# Patient Record
Sex: FEMALE | URBAN - METROPOLITAN AREA
[De-identification: names, ages, dates, MRNs, and addresses within clinical notes are randomized per-mention and may not be internally consistent; named-entity substitution may affect disease eponyms.]

---

## 2017-08-14 ENCOUNTER — NURSE ONLY (OUTPATIENT)
Dept: FAMILY MEDICINE CLINIC | Facility: CLINIC | Age: 32
End: 2017-08-14

## 2017-08-14 VITALS — DIASTOLIC BLOOD PRESSURE: 68 MMHG | OXYGEN SATURATION: 98 % | HEART RATE: 74 BPM | SYSTOLIC BLOOD PRESSURE: 104 MMHG

## 2017-08-14 DIAGNOSIS — H11.32 SUBCONJUNCTIVAL HEMORRHAGE, NON-TRAUMATIC, LEFT: Primary | ICD-10-CM

## 2017-08-14 PROCEDURE — 99201 OFFICE/OUTPT VISIT,NEW,LEVL I: CPT | Performed by: NURSE PRACTITIONER

## 2017-08-14 NOTE — PATIENT INSTRUCTIONS
Hemorragia subconjuntival    Tiarra hemorragia subconjuntival se produce angela resultado de un vaso sanguíneo que se ha roto en la parte caro del ingrid. No suele causar dolor y puede haberse roto al toser, estornudar o vomitar.  Leslie Vazquze en el ingrid también p

## 2017-08-17 ENCOUNTER — CHARTING TRANS (OUTPATIENT)
Dept: OTHER | Age: 32
End: 2017-08-17

## 2017-08-17 ENCOUNTER — LAB SERVICES (OUTPATIENT)
Dept: OTHER | Age: 32
End: 2017-08-17

## 2017-08-17 LAB
AMB EXT CHLAMYDIA, NUCLEIC ACID AMP: NEGATIVE
AMB EXT GONOCOCCUS, NUCLEIC ACID AMP: NEGATIVE
ANTIBODY SCREEN OB: NEGATIVE
HEPATITIS B SURFACE ANTIGEN OB: NEGATIVE
HIV RESULT OB: NEGATIVE
RH FACTOR OB: POSITIVE

## 2017-08-31 ENCOUNTER — CHARTING TRANS (OUTPATIENT)
Dept: OTHER | Age: 32
End: 2017-08-31

## 2017-08-31 LAB
ABO + RH BLD: NORMAL
BACTERIA UR CULT: NORMAL
BASOPHILS # BLD: 0 K/MCL (ref 0–0.3)
BASOPHILS NFR BLD: 0 %
BLD GP AB SCN SERPL QL: NEGATIVE
C TRACH RRNA SPEC QL NAA+PROBE: NEGATIVE
DIFFERENTIAL METHOD BLD: NORMAL
EOSINOPHIL # BLD: 0.4 K/MCL (ref 0.1–0.5)
EOSINOPHIL NFR BLD: 4 %
ERYTHROCYTE [DISTWIDTH] IN BLOOD: 13.6 % (ref 11–15)
HBV SURFACE AG SER QL: NEGATIVE
HEMATOCRIT: 36.9 % (ref 36–46.5)
HEMOGLOBIN: 12.3 G/DL (ref 12–15.5)
HIV 1+2 AB+HIV1 P24 AG SERPL QL IA: NONREACTIVE
LYMPHOCYTES # BLD: 2.3 K/MCL (ref 1–4.8)
LYMPHOCYTES NFR BLD: 22 %
MEAN CORPUSCULAR HEMOGLOBIN: 27.9 PG (ref 26–34)
MEAN CORPUSCULAR HGB CONC: 33.3 G/DL (ref 32–36.5)
MEAN CORPUSCULAR VOLUME: 83.7 FL (ref 78–100)
MONOCYTES # BLD: 0.7 K/MCL (ref 0.3–0.9)
MONOCYTES NFR BLD: 6 %
N GONORRHOEA RRNA SPEC QL NAA+PROBE: NEGATIVE
NEUTROPHILS # BLD: 7 K/MCL (ref 1.8–7.7)
NEUTROPHILS NFR BLD: 68 %
PLATELET COUNT: 312 K/MCL (ref 140–450)
RED CELL COUNT: 4.41 MIL/MCL (ref 4–5.2)
RUBV IGG SERPL IA-ACNC: >500 UNITS/ML
SPECIMEN SOURCE: NORMAL
T PALLIDUM IGG SER QL IA: NONREACTIVE
VZV IGG SER IA-ACNC: 2.32 OD RATIO
WHITE BLOOD COUNT: 10.4 K/MCL (ref 4.2–11)

## 2017-09-01 ENCOUNTER — LAB SERVICES (OUTPATIENT)
Dept: OTHER | Age: 32
End: 2017-09-01

## 2017-09-01 ENCOUNTER — CHARTING TRANS (OUTPATIENT)
Dept: OTHER | Age: 32
End: 2017-09-01

## 2017-09-01 LAB
GLUCOSE U: NORMAL
KETONES: NORMAL
LEUKOCYTES: NORMAL
NITRITE: NORMAL
OCCULT BLOOD: NORMAL
PH: 6
PROTEIN: NORMAL

## 2017-09-12 ENCOUNTER — CHARTING TRANS (OUTPATIENT)
Dept: OTHER | Age: 32
End: 2017-09-12

## 2017-09-13 LAB — HPV I/H RISK 4 DNA CVX QL NAA+PROBE: NORMAL

## 2017-10-04 ENCOUNTER — CHARTING TRANS (OUTPATIENT)
Dept: OTHER | Age: 32
End: 2017-10-04

## 2017-10-04 ENCOUNTER — LAB SERVICES (OUTPATIENT)
Dept: OTHER | Age: 32
End: 2017-10-04

## 2017-10-04 LAB
GLUCOSE U: NORMAL
KETONES: NEGATIVE
LEUKOCYTES: NEGATIVE
NITRITE: NEGATIVE
OCCULT BLOOD: NEGATIVE
PROTEIN: NEGATIVE

## 2017-10-27 ENCOUNTER — CHARTING TRANS (OUTPATIENT)
Dept: OTHER | Age: 32
End: 2017-10-27

## 2017-10-27 LAB
GLUCOSE U: NORMAL
PROTEIN: NORMAL

## 2017-11-18 ENCOUNTER — LAB SERVICES (OUTPATIENT)
Dept: OTHER | Age: 32
End: 2017-11-18

## 2017-11-20 LAB
BASOPHILS # BLD: 0 K/MCL (ref 0–0.3)
BASOPHILS NFR BLD: 0 %
DIFFERENTIAL METHOD BLD: ABNORMAL
EOSINOPHIL # BLD: 0.3 K/MCL (ref 0.1–0.5)
EOSINOPHIL NFR BLD: 3 %
ERYTHROCYTE [DISTWIDTH] IN BLOOD: 14.5 % (ref 11–15)
HEMATOCRIT: 34.1 % (ref 36–46.5)
HEMOGLOBIN: 11 G/DL (ref 12–15.5)
LYMPHOCYTES # BLD: 1.8 K/MCL (ref 1–4.8)
LYMPHOCYTES NFR BLD: 17 %
MEAN CORPUSCULAR HEMOGLOBIN: 28.9 PG (ref 26–34)
MEAN CORPUSCULAR HGB CONC: 32.3 G/DL (ref 32–36.5)
MEAN CORPUSCULAR VOLUME: 89.7 FL (ref 78–100)
MONOCYTES # BLD: 0.7 K/MCL (ref 0.3–0.9)
MONOCYTES NFR BLD: 7 %
NEUTROPHILS # BLD: 8 K/MCL (ref 1.8–7.7)
NEUTROPHILS NFR BLD: 73 %
PLAT MORPH BLD: NORMAL
PLATELET COUNT: 311 K/MCL (ref 140–450)
RBC MORPH BLD: NORMAL
RED CELL COUNT: 3.8 MIL/MCL (ref 4–5.2)
WHITE BLOOD COUNT: 10.9 K/MCL (ref 4.2–11)

## 2017-11-28 ENCOUNTER — CHARTING TRANS (OUTPATIENT)
Dept: OTHER | Age: 32
End: 2017-11-28

## 2017-11-28 LAB — GLUCOSE 1H P 50 G GLC PO SERPL-MCNC: 112 MG/DL (ref 65–139)

## 2017-12-15 ENCOUNTER — LAB SERVICES (OUTPATIENT)
Dept: OTHER | Age: 32
End: 2017-12-15

## 2017-12-29 ENCOUNTER — IMAGING SERVICES (OUTPATIENT)
Dept: OTHER | Age: 32
End: 2017-12-29

## 2017-12-29 ENCOUNTER — HOSPITAL (OUTPATIENT)
Dept: OTHER | Age: 32
End: 2017-12-29
Attending: OBSTETRICS & GYNECOLOGY

## 2017-12-31 ENCOUNTER — CHARTING TRANS (OUTPATIENT)
Dept: OTHER | Age: 32
End: 2017-12-31

## 2018-01-02 LAB
HIV 1+2 AB+HIV1 P24 AG SERPL QL IA: NONREACTIVE
RPR SER QL: NONREACTIVE

## 2018-01-08 ENCOUNTER — CHARTING TRANS (OUTPATIENT)
Dept: OTHER | Age: 33
End: 2018-01-08

## 2018-01-16 ENCOUNTER — HOSPITAL ENCOUNTER (OUTPATIENT)
Facility: HOSPITAL | Age: 33
Setting detail: OBSERVATION
Discharge: HOME | End: 2018-01-16
Attending: OBSTETRICS & GYNECOLOGY | Admitting: OBSTETRICS & GYNECOLOGY

## 2018-01-16 VITALS
SYSTOLIC BLOOD PRESSURE: 107 MMHG | TEMPERATURE: 98 F | RESPIRATION RATE: 18 BRPM | DIASTOLIC BLOOD PRESSURE: 61 MMHG | HEART RATE: 79 BPM

## 2018-01-16 PROBLEM — O26.899 ABDOMINAL PAIN DURING PREGNANCY: Status: ACTIVE | Noted: 2018-01-16

## 2018-01-16 PROBLEM — R10.9 ABDOMINAL PAIN DURING PREGNANCY (HCC): Status: ACTIVE | Noted: 2018-01-16

## 2018-01-16 PROBLEM — O26.899 ABDOMINAL PAIN DURING PREGNANCY (HCC): Status: ACTIVE | Noted: 2018-01-16

## 2018-01-16 PROBLEM — R10.9 ABDOMINAL PAIN DURING PREGNANCY: Status: ACTIVE | Noted: 2018-01-16

## 2018-01-16 PROCEDURE — 76815 OB US LIMITED FETUS(S): CPT

## 2018-01-16 PROCEDURE — 99202 OFFICE O/P NEW SF 15 MIN: CPT

## 2018-01-16 PROCEDURE — 59025 FETAL NON-STRESS TEST: CPT

## 2018-01-16 RX ORDER — PRENATAL VIT/IRON FUM/FOLIC AC 27MG-0.8MG
1 TABLET ORAL DAILY
COMMUNITY

## 2018-01-17 NOTE — PROGRESS NOTES
Pt receives [facility] with Dr. James Vang at [facility]. Reports upper right quadrant pain. Constant burning pain 9/10. Near place where infant head is located in abdomen as  reports infant is breech.  Pt reports baby is moving, but only a small amount an

## 2018-01-17 NOTE — TRIAGE
[facility]      Triage Note    Yana Medina Patient Status:  Observation    9/15/1985 MRN [id number]   Location [address]  Attending Tomas Dugan MD   Hosp Day # 0 PCP No primary care provider on file. abdomen as  reports infant is breech. Pt reports baby is moving, but only a small amount and it feels \"different like baby \"is balled up (points to upper right abdomen). \" Pt denies urinary symptoms. Pt denies leaking of fluid.  Pt is previous cesa

## 2018-01-17 NOTE — DISCHARGE PLANNING
Discharge planning reviewed with patient in [facility] and [address] [facility] handouts given on pre-eclampsia, pre-term labor, and kick counts.  Pt will follow- up with her doctor on Thursday as scheduled

## 2018-01-17 NOTE — PROGRESS NOTES
Consent for prenatal records signed by the patient and faxed to [facility]. L/D called and records requested.

## 2018-01-18 ENCOUNTER — CHARTING TRANS (OUTPATIENT)
Dept: OTHER | Age: 33
End: 2018-01-18

## 2018-01-25 ENCOUNTER — CHARTING TRANS (OUTPATIENT)
Dept: OTHER | Age: 33
End: 2018-01-25

## 2018-01-25 ENCOUNTER — LAB SERVICES (OUTPATIENT)
Dept: OTHER | Age: 33
End: 2018-01-25

## 2018-01-25 LAB
GLUCOSE U: NORMAL
PROTEIN: NORMAL

## 2018-01-29 LAB — GP B STREP CULTURE (STGEN) HL: NORMAL

## 2018-01-31 ENCOUNTER — CHARTING TRANS (OUTPATIENT)
Dept: OTHER | Age: 33
End: 2018-01-31

## 2018-02-02 ENCOUNTER — HOSPITAL (OUTPATIENT)
Dept: OTHER | Age: 33
End: 2018-02-02
Attending: OBSTETRICS & GYNECOLOGY

## 2018-02-02 LAB
ANALYZER ANC (IANC): ABNORMAL
BASOPHILS # BLD: 0 THOUSAND/MCL (ref 0–0.3)
BASOPHILS NFR BLD: 0 %
DIFFERENTIAL METHOD BLD: ABNORMAL
EOSINOPHIL # BLD: 0.1 THOUSAND/MCL (ref 0.1–0.5)
EOSINOPHIL NFR BLD: 1 %
ERYTHROCYTE [DISTWIDTH] IN BLOOD: 14.8 % (ref 11–15)
HEMATOCRIT: 34.1 % (ref 36–46.5)
HGB BLD-MCNC: 11.6 GM/DL (ref 12–15.5)
LYMPHOCYTES # BLD: 2.6 THOUSAND/MCL (ref 1–4.8)
LYMPHOCYTES NFR BLD: 24 %
MCH RBC QN AUTO: 28.1 PG (ref 26–34)
MCHC RBC AUTO-ENTMCNC: 34 GM/DL (ref 32–36.5)
MCV RBC AUTO: 82.6 FL (ref 78–100)
MONOCYTES # BLD: 0.8 THOUSAND/MCL (ref 0.3–0.9)
MONOCYTES NFR BLD: 7 %
NEUTROPHILS # BLD: 7.2 THOUSAND/MCL (ref 1.8–7.7)
NEUTROPHILS NFR BLD: 68 %
NEUTS SEG NFR BLD: ABNORMAL %
PERCENT NRBC: ABNORMAL
PLATELET # BLD: 284 THOUSAND/MCL (ref 140–450)
RBC # BLD: 4.13 MILLION/MCL (ref 4–5.2)
WBC # BLD: 10.8 THOUSAND/MCL (ref 4.2–11)

## 2018-02-03 LAB
HEMATOCRIT: 28.8 % (ref 36–46.5)
HGB BLD-MCNC: 9.6 GM/DL (ref 12–15.5)

## 2018-03-21 ENCOUNTER — CHARTING TRANS (OUTPATIENT)
Dept: OTHER | Age: 33
End: 2018-03-21

## 2018-04-18 ENCOUNTER — CHARTING TRANS (OUTPATIENT)
Dept: OTHER | Age: 33
End: 2018-04-18

## 2018-04-18 ENCOUNTER — LAB SERVICES (OUTPATIENT)
Dept: OTHER | Age: 33
End: 2018-04-18

## 2018-04-18 LAB — MONOCLONAL PREGNANCY: NORMAL

## 2018-04-20 ENCOUNTER — CHARTING TRANS (OUTPATIENT)
Dept: OTHER | Age: 33
End: 2018-04-20

## 2018-04-24 LAB — SURGICAL PATHOLOGY: NORMAL

## 2018-11-01 VITALS
SYSTOLIC BLOOD PRESSURE: 110 MMHG | DIASTOLIC BLOOD PRESSURE: 60 MMHG | HEIGHT: 63 IN | WEIGHT: 181 LBS | BODY MASS INDEX: 32.07 KG/M2

## 2018-11-01 VITALS
HEIGHT: 63 IN | WEIGHT: 203 LBS | DIASTOLIC BLOOD PRESSURE: 64 MMHG | BODY MASS INDEX: 35.97 KG/M2 | SYSTOLIC BLOOD PRESSURE: 120 MMHG

## 2018-11-02 VITALS — DIASTOLIC BLOOD PRESSURE: 68 MMHG | SYSTOLIC BLOOD PRESSURE: 108 MMHG | WEIGHT: 186 LBS

## 2018-11-02 VITALS
HEIGHT: 63 IN | WEIGHT: 198 LBS | SYSTOLIC BLOOD PRESSURE: 108 MMHG | BODY MASS INDEX: 35.08 KG/M2 | DIASTOLIC BLOOD PRESSURE: 62 MMHG

## 2018-11-02 VITALS — DIASTOLIC BLOOD PRESSURE: 62 MMHG | WEIGHT: 200 LBS | SYSTOLIC BLOOD PRESSURE: 98 MMHG

## 2018-11-02 VITALS — WEIGHT: 199 LBS | SYSTOLIC BLOOD PRESSURE: 118 MMHG | DIASTOLIC BLOOD PRESSURE: 64 MMHG

## 2018-11-02 VITALS — SYSTOLIC BLOOD PRESSURE: 102 MMHG | DIASTOLIC BLOOD PRESSURE: 62 MMHG | WEIGHT: 176 LBS

## 2018-11-02 VITALS
HEIGHT: 63 IN | SYSTOLIC BLOOD PRESSURE: 134 MMHG | DIASTOLIC BLOOD PRESSURE: 76 MMHG | BODY MASS INDEX: 34.2 KG/M2 | WEIGHT: 193 LBS

## 2018-11-02 VITALS
WEIGHT: 172 LBS | HEIGHT: 63 IN | BODY MASS INDEX: 30.48 KG/M2 | DIASTOLIC BLOOD PRESSURE: 66 MMHG | SYSTOLIC BLOOD PRESSURE: 110 MMHG

## 2018-11-03 VITALS — WEIGHT: 169 LBS | BODY MASS INDEX: 29.95 KG/M2 | HEIGHT: 63 IN

## 2018-11-03 VITALS
HEIGHT: 63 IN | WEIGHT: 171 LBS | DIASTOLIC BLOOD PRESSURE: 58 MMHG | SYSTOLIC BLOOD PRESSURE: 110 MMHG | BODY MASS INDEX: 30.3 KG/M2

## 2023-02-06 ENCOUNTER — OFFICE VISIT (OUTPATIENT)
Dept: OBGYN CLINIC | Facility: CLINIC | Age: 38
End: 2023-02-06

## 2023-02-06 VITALS — WEIGHT: 182.19 LBS | DIASTOLIC BLOOD PRESSURE: 67 MMHG | SYSTOLIC BLOOD PRESSURE: 118 MMHG

## 2023-02-06 DIAGNOSIS — N92.6 MISSED MENSES: Primary | ICD-10-CM

## 2023-02-06 LAB
CONTROL LINE PRESENT WITH A CLEAR BACKGROUND (YES/NO): YES YES/NO
KIT LOT #: 2034 NUMERIC
PREGNANCY TEST, URINE: POSITIVE

## 2023-02-06 PROCEDURE — 99202 OFFICE O/P NEW SF 15 MIN: CPT | Performed by: OBSTETRICS & GYNECOLOGY

## 2023-02-06 PROCEDURE — 3078F DIAST BP <80 MM HG: CPT | Performed by: OBSTETRICS & GYNECOLOGY

## 2023-02-06 PROCEDURE — 81025 URINE PREGNANCY TEST: CPT | Performed by: OBSTETRICS & GYNECOLOGY

## 2023-02-06 PROCEDURE — 3074F SYST BP LT 130 MM HG: CPT | Performed by: OBSTETRICS & GYNECOLOGY

## 2023-02-13 ENCOUNTER — OFFICE VISIT (OUTPATIENT)
Dept: OBGYN CLINIC | Facility: CLINIC | Age: 38
End: 2023-02-13

## 2023-02-13 VITALS — SYSTOLIC BLOOD PRESSURE: 141 MMHG | DIASTOLIC BLOOD PRESSURE: 87 MMHG

## 2023-02-13 DIAGNOSIS — Z36.87 UNSURE OF LMP (LAST MENSTRUAL PERIOD) AS REASON FOR ULTRASOUND SCAN: Primary | ICD-10-CM

## 2023-02-13 DIAGNOSIS — Z32.01 PREGNANCY EXAMINATION OR TEST, POSITIVE RESULT: ICD-10-CM

## 2023-02-21 ENCOUNTER — TELEPHONE (OUTPATIENT)
Dept: OBGYN CLINIC | Facility: CLINIC | Age: 38
End: 2023-02-21

## 2023-02-21 ENCOUNTER — NURSE ONLY (OUTPATIENT)
Dept: OBGYN CLINIC | Facility: CLINIC | Age: 38
End: 2023-02-21

## 2023-02-21 VITALS — BODY MASS INDEX: 33 KG/M2 | HEIGHT: 62 IN

## 2023-02-21 DIAGNOSIS — Z34.82 ENCOUNTER FOR SUPERVISION OF OTHER NORMAL PREGNANCY IN SECOND TRIMESTER: Primary | ICD-10-CM

## 2023-02-21 NOTE — PROGRESS NOTES
Pt Name and  verified. OB History     T2    L2    SAB0  IAB0  Ectopic0  Multiple0  Live Births2   Pt called today for RN [facility] Education. Missed menses apt with: CHARLES  LMP: 2022    Pre  BMI: 31.09 ASA therapy initiated. EPDS score: 0/30  +UPT at home: 2022    +UPT in office:  2023    US: 2023  Working BALTA: 2023  Hx of genetic abnormality in family: none  Hx of varicella: had as a child   Flu Vaccine: has received   Covid Vaccine: Has received and is boosted.  Consent (if needed): Repeat      Sterilization/Contraception: interested in Tubal.    OUD Screening: Pt. Has answered NO 5P questions and has NO  risk factors. Pt. Given What pregnant women need to know handout. SDOH was completed. Educational material reviewed with patient: Prenatal care, nutrition, weight gain recommendations, travel, exercise, intercourse, pregnancy changes, safe medications, pregnancy and work, fetal movement, labor and  labor, warning signs, food safety, tdap, cord blood, breastfeeding and bottle feeding, circumcision no, and Group B strep. Blood transfusion if needed: yes if needed in PN care. PN labs: placed and pt informed to complete prior to next apt. Optional genetic screening labs were reviewed: Nena Riddles, FTS with US, Quad screen MSAFP and CF screening. Declines genetic testing at this time. [facility] Media Policy: reviewed and VU. NOB apt: with CHARLES.

## 2023-02-22 ENCOUNTER — LAB ENCOUNTER (OUTPATIENT)
Dept: LAB | Age: 38
End: 2023-02-22
Attending: OBSTETRICS & GYNECOLOGY
Payer: MEDICAID

## 2023-02-22 DIAGNOSIS — Z34.82 ENCOUNTER FOR SUPERVISION OF OTHER NORMAL PREGNANCY IN SECOND TRIMESTER: ICD-10-CM

## 2023-02-22 LAB
ANTIBODY SCREEN: NEGATIVE
BASOPHILS # BLD AUTO: 0.05 X10(3) UL (ref 0–0.2)
BASOPHILS NFR BLD AUTO: 0.4 %
BILIRUB UR QL: NEGATIVE
CLARITY UR: CLEAR
DEPRECATED HBV CORE AB SER IA-ACNC: 37.4 NG/ML
DEPRECATED RDW RBC AUTO: 39.7 FL (ref 35.1–46.3)
EOSINOPHIL # BLD AUTO: 0.18 X10(3) UL (ref 0–0.7)
EOSINOPHIL NFR BLD AUTO: 1.5 %
ERYTHROCYTE [DISTWIDTH] IN BLOOD BY AUTOMATED COUNT: 13.2 % (ref 11–15)
GLUCOSE UR-MCNC: 150 MG/DL
HBV SURFACE AG SER-ACNC: <0.1 [IU]/L
HBV SURFACE AG SERPL QL IA: NONREACTIVE
HCT VFR BLD AUTO: 33.6 %
HCV AB SERPL QL IA: NONREACTIVE
HGB BLD-MCNC: 11.4 G/DL
HGB UR QL STRIP.AUTO: NEGATIVE
IMM GRANULOCYTES # BLD AUTO: 0.04 X10(3) UL (ref 0–1)
IMM GRANULOCYTES NFR BLD: 0.3 %
KETONES UR-MCNC: 40 MG/DL
LEUKOCYTE ESTERASE UR QL STRIP.AUTO: NEGATIVE
LYMPHOCYTES # BLD AUTO: 2.79 X10(3) UL (ref 1–4)
LYMPHOCYTES NFR BLD AUTO: 23.8 %
MCH RBC QN AUTO: 28 PG (ref 26–34)
MCHC RBC AUTO-ENTMCNC: 33.9 G/DL (ref 31–37)
MCV RBC AUTO: 82.6 FL
MONOCYTES # BLD AUTO: 0.5 X10(3) UL (ref 0.1–1)
MONOCYTES NFR BLD AUTO: 4.3 %
NEUTROPHILS # BLD AUTO: 8.18 X10 (3) UL (ref 1.5–7.7)
NEUTROPHILS # BLD AUTO: 8.18 X10(3) UL (ref 1.5–7.7)
NEUTROPHILS NFR BLD AUTO: 69.7 %
NITRITE UR QL STRIP.AUTO: NEGATIVE
PH UR: 6 [PH] (ref 5–8)
PLATELET # BLD AUTO: 323 10(3)UL (ref 150–450)
PROT UR-MCNC: NEGATIVE MG/DL
RBC # BLD AUTO: 4.07 X10(6)UL
RH BLOOD TYPE: POSITIVE
RUBV IGG SER QL: POSITIVE
RUBV IGG SER-ACNC: >500 IU/ML (ref 10–?)
SP GR UR STRIP: 1.02 (ref 1–1.03)
UROBILINOGEN UR STRIP-ACNC: NORMAL
WBC # BLD AUTO: 11.7 X10(3) UL (ref 4–11)

## 2023-02-22 PROCEDURE — 86780 TREPONEMA PALLIDUM: CPT

## 2023-02-22 PROCEDURE — 86901 BLOOD TYPING SEROLOGIC RH(D): CPT

## 2023-02-22 PROCEDURE — 86803 HEPATITIS C AB TEST: CPT

## 2023-02-22 PROCEDURE — 86850 RBC ANTIBODY SCREEN: CPT

## 2023-02-22 PROCEDURE — 85025 COMPLETE CBC W/AUTO DIFF WBC: CPT

## 2023-02-22 PROCEDURE — 87389 HIV-1 AG W/HIV-1&-2 AB AG IA: CPT

## 2023-02-22 PROCEDURE — 87340 HEPATITIS B SURFACE AG IA: CPT

## 2023-02-22 PROCEDURE — 87086 URINE CULTURE/COLONY COUNT: CPT

## 2023-02-22 PROCEDURE — 36415 COLL VENOUS BLD VENIPUNCTURE: CPT

## 2023-02-22 PROCEDURE — 86762 RUBELLA ANTIBODY: CPT

## 2023-02-22 PROCEDURE — 82728 ASSAY OF FERRITIN: CPT

## 2023-02-22 PROCEDURE — 86900 BLOOD TYPING SEROLOGIC ABO: CPT

## 2023-02-24 LAB — T PALLIDUM AB SER QL: NEGATIVE

## 2023-02-27 ENCOUNTER — TELEPHONE (OUTPATIENT)
Dept: OBGYN CLINIC | Facility: CLINIC | Age: 38
End: 2023-02-27

## 2023-02-27 DIAGNOSIS — E66.01 OBESITY, MORBID, BMI 50 OR HIGHER (HCC): Primary | ICD-10-CM

## 2023-02-27 NOTE — TELEPHONE ENCOUNTER
Spoke to patient informed of normal initial prenatal labs, also informed early 1hr gtt needs to be done. Informed patient she can go in 1-2 weeks and lab was entered in epic. Patient verbalized understanding.

## 2023-02-27 NOTE — TELEPHONE ENCOUNTER
----- Message from Divina Rea MD sent at 2/26/2023  4:49 PM CST -----  Please notify patient of normal initial prenatal labs. She will need to have a 50 gram glucola due to maternal BMI with the next 1-2 weeks.   Result noted

## 2023-02-28 ENCOUNTER — INITIAL PRENATAL (OUTPATIENT)
Dept: OBGYN CLINIC | Facility: CLINIC | Age: 38
End: 2023-02-28

## 2023-02-28 VITALS — BODY MASS INDEX: 33 KG/M2 | SYSTOLIC BLOOD PRESSURE: 137 MMHG | DIASTOLIC BLOOD PRESSURE: 83 MMHG | WEIGHT: 182 LBS

## 2023-02-28 DIAGNOSIS — O99.210 OBESITY IN PREGNANCY: ICD-10-CM

## 2023-02-28 DIAGNOSIS — O09.529 ANTEPARTUM MULTIGRAVIDA OF ADVANCED MATERNAL AGE: ICD-10-CM

## 2023-02-28 DIAGNOSIS — O34.219 PREVIOUS CESAREAN DELIVERY, ANTEPARTUM CONDITION OR COMPLICATION: ICD-10-CM

## 2023-02-28 DIAGNOSIS — Z34.82 ENCOUNTER FOR SUPERVISION OF OTHER NORMAL PREGNANCY IN SECOND TRIMESTER: Primary | ICD-10-CM

## 2023-02-28 PROCEDURE — 0500F INITIAL PRENATAL CARE VISIT: CPT | Performed by: OBSTETRICS & GYNECOLOGY

## 2023-02-28 PROCEDURE — 3075F SYST BP GE 130 - 139MM HG: CPT | Performed by: OBSTETRICS & GYNECOLOGY

## 2023-02-28 PROCEDURE — 3079F DIAST BP 80-89 MM HG: CPT | Performed by: OBSTETRICS & GYNECOLOGY

## 2023-03-01 LAB
C TRACH DNA SPEC QL NAA+PROBE: NEGATIVE
HPV I/H RISK 1 DNA SPEC QL NAA+PROBE: NEGATIVE
N GONORRHOEA DNA SPEC QL NAA+PROBE: NEGATIVE

## 2023-03-06 ENCOUNTER — TELEPHONE (OUTPATIENT)
Dept: PERINATAL CARE | Facility: HOSPITAL | Age: 38
End: 2023-03-06

## 2023-03-26 ENCOUNTER — LAB ENCOUNTER (OUTPATIENT)
Dept: LAB | Facility: HOSPITAL | Age: 38
End: 2023-03-26
Attending: OBSTETRICS & GYNECOLOGY
Payer: MEDICAID

## 2023-03-26 DIAGNOSIS — E66.01 OBESITY, MORBID, BMI 50 OR HIGHER (HCC): ICD-10-CM

## 2023-03-26 LAB — GLUCOSE 1H P GLC SERPL-MCNC: 174 MG/DL

## 2023-03-26 PROCEDURE — 36415 COLL VENOUS BLD VENIPUNCTURE: CPT

## 2023-03-26 PROCEDURE — 82950 GLUCOSE TEST: CPT

## 2023-03-27 ENCOUNTER — TELEPHONE (OUTPATIENT)
Dept: OBGYN CLINIC | Facility: CLINIC | Age: 38
End: 2023-03-27

## 2023-03-27 DIAGNOSIS — O99.810 ABNORMAL MATERNAL GLUCOSE TOLERANCE, ANTEPARTUM: Primary | ICD-10-CM

## 2023-03-27 NOTE — TELEPHONE ENCOUNTER
----- Message from Maren Rod MD sent at 3/26/2023  7:15 PM CDT -----  Please notify patient of elevated 50 gram glucola and have her scheduled for a 3 hr GTT.

## 2023-03-27 NOTE — TELEPHONE ENCOUNTER
---Georgian phone line  #[phone] used to translate phone call. Pt called and informed of results and recommendations. Pt voices understanding. Order for 3 hour glucose placed. Pt informed to call centralized scheduling to schedule test. Pt placed in follow up book. ---- Message from Maren Rod MD sent at 3/26/2023  7:15 PM CDT -----  Please notify patient of elevated 50 gram glucola and have her scheduled for a 3 hr GTT.

## 2023-04-03 ENCOUNTER — TELEPHONE (OUTPATIENT)
Dept: PERINATAL CARE | Facility: HOSPITAL | Age: 38
End: 2023-04-03

## 2023-04-07 ENCOUNTER — ROUTINE PRENATAL (OUTPATIENT)
Dept: OBGYN CLINIC | Facility: CLINIC | Age: 38
End: 2023-04-07

## 2023-04-07 VITALS — WEIGHT: 185 LBS | BODY MASS INDEX: 34 KG/M2 | SYSTOLIC BLOOD PRESSURE: 116 MMHG | DIASTOLIC BLOOD PRESSURE: 78 MMHG

## 2023-04-07 DIAGNOSIS — Z34.82 ENCOUNTER FOR SUPERVISION OF OTHER NORMAL PREGNANCY IN SECOND TRIMESTER: Primary | ICD-10-CM

## 2023-04-07 LAB
APPEARANCE: CLEAR
BILIRUBIN: NEGATIVE
GLUCOSE (URINE DIPSTICK): NEGATIVE MG/DL
KETONES (URINE DIPSTICK): NEGATIVE MG/DL
LEUKOCYTES: NEGATIVE
MULTISTIX LOT#: NORMAL NUMERIC
NITRITE, URINE: NEGATIVE
OCCULT BLOOD: NEGATIVE
PH, URINE: 6.5 (ref 4.5–8)
PROTEIN (URINE DIPSTICK): NEGATIVE MG/DL
SPECIFIC GRAVITY: 1.02 (ref 1–1.03)
URINE-COLOR: YELLOW
UROBILINOGEN,SEMI-QN: 0.2 MG/DL (ref 0–1.9)

## 2023-04-07 PROCEDURE — 0502F SUBSEQUENT PRENATAL CARE: CPT | Performed by: OBSTETRICS & GYNECOLOGY

## 2023-04-07 PROCEDURE — 81002 URINALYSIS NONAUTO W/O SCOPE: CPT | Performed by: OBSTETRICS & GYNECOLOGY

## 2023-04-07 PROCEDURE — 3078F DIAST BP <80 MM HG: CPT | Performed by: OBSTETRICS & GYNECOLOGY

## 2023-04-07 PROCEDURE — 3074F SYST BP LT 130 MM HG: CPT | Performed by: OBSTETRICS & GYNECOLOGY

## 2023-04-09 ENCOUNTER — LABORATORY ENCOUNTER (OUTPATIENT)
Dept: LAB | Facility: HOSPITAL | Age: 38
End: 2023-04-09
Attending: OBSTETRICS & GYNECOLOGY
Payer: MEDICAID

## 2023-04-09 DIAGNOSIS — O99.810 ABNORMAL MATERNAL GLUCOSE TOLERANCE, ANTEPARTUM: ICD-10-CM

## 2023-04-09 LAB
GLUCOSE 1H P GLC SERPL-MCNC: 183 MG/DL
GLUCOSE 2H P GLC SERPL-MCNC: 133 MG/DL
GLUCOSE 3H P GLC SERPL-MCNC: 81 MG/DL (ref 70–140)
GLUCOSE P FAST SERPL-MCNC: 95 MG/DL

## 2023-04-09 PROCEDURE — 82952 GTT-ADDED SAMPLES: CPT

## 2023-04-09 PROCEDURE — 36415 COLL VENOUS BLD VENIPUNCTURE: CPT

## 2023-04-09 PROCEDURE — 82951 GLUCOSE TOLERANCE TEST (GTT): CPT

## 2023-04-10 ENCOUNTER — LAB ENCOUNTER (OUTPATIENT)
Dept: LAB | Age: 38
End: 2023-04-10
Attending: OBSTETRICS & GYNECOLOGY
Payer: MEDICAID

## 2023-04-10 ENCOUNTER — TELEPHONE (OUTPATIENT)
Dept: OBGYN CLINIC | Facility: CLINIC | Age: 38
End: 2023-04-10

## 2023-04-10 DIAGNOSIS — O24.419 GESTATIONAL DIABETES MELLITUS (GDM) IN SECOND TRIMESTER, GESTATIONAL DIABETES METHOD OF CONTROL UNSPECIFIED: ICD-10-CM

## 2023-04-10 DIAGNOSIS — O24.419 GESTATIONAL DIABETES MELLITUS (GDM) IN SECOND TRIMESTER, GESTATIONAL DIABETES METHOD OF CONTROL UNSPECIFIED: Primary | ICD-10-CM

## 2023-04-10 LAB
EST. AVERAGE GLUCOSE BLD GHB EST-MCNC: 103 MG/DL (ref 68–126)
HBA1C MFR BLD: 5.2 % (ref ?–5.7)

## 2023-04-10 PROCEDURE — 36415 COLL VENOUS BLD VENIPUNCTURE: CPT

## 2023-04-10 PROCEDURE — 3044F HG A1C LEVEL LT 7.0%: CPT | Performed by: NURSE PRACTITIONER

## 2023-04-10 PROCEDURE — 83036 HEMOGLOBIN GLYCOSYLATED A1C: CPT

## 2023-04-10 PROCEDURE — 87086 URINE CULTURE/COLONY COUNT: CPT

## 2023-04-10 NOTE — TELEPHONE ENCOUNTER
----- Message from Ninoska Oliva MD sent at 4/9/2023  7:45 PM CDT -----  Please notify patient of abnormal 3 hour Glucola testing and the diagnosis of gestational diabetes. Patient will need to go for diabetic education and learn to do 4 times daily Accu-Cheks. She will need to have a dietitian consultation to discuss ADA diet. Patient will need to have a hemoglobin A1c as well as a urine culture and sensitivity ordered.   Patient should follow-up in the office with her 4 times daily Accu-Chek results 1 week after seeing the diabetic educator and learning to do this testing

## 2023-04-10 NOTE — TELEPHONE ENCOUNTER
Patient name and  verified. Patient informed of JJF message and recommendations. Orders placed and advised to complete labs. Verbalized understanding.

## 2023-04-11 ENCOUNTER — HOSPITAL ENCOUNTER (OUTPATIENT)
Dept: PERINATAL CARE | Facility: HOSPITAL | Age: 38
Discharge: HOME | End: 2023-04-11
Attending: OBSTETRICS & GYNECOLOGY
Payer: MEDICAID

## 2023-04-11 ENCOUNTER — HOSPITAL ENCOUNTER (OUTPATIENT)
Dept: ENDOCRINOLOGY | Age: 38
Discharge: HOME | End: 2023-04-11
Attending: OBSTETRICS & GYNECOLOGY
Payer: MEDICAID

## 2023-04-11 VITALS
WEIGHT: 186 LBS | SYSTOLIC BLOOD PRESSURE: 118 MMHG | DIASTOLIC BLOOD PRESSURE: 77 MMHG | BODY MASS INDEX: 34 KG/M2 | HEART RATE: 96 BPM

## 2023-04-11 DIAGNOSIS — O09.522 AMA (ADVANCED MATERNAL AGE) MULTIGRAVIDA 35+, SECOND TRIMESTER: ICD-10-CM

## 2023-04-11 DIAGNOSIS — O09.522 AMA (ADVANCED MATERNAL AGE) MULTIGRAVIDA 35+, SECOND TRIMESTER: Primary | ICD-10-CM

## 2023-04-11 DIAGNOSIS — Z82.79 FAMILY HISTORY OF CONGENITAL ANOMALY: ICD-10-CM

## 2023-04-11 DIAGNOSIS — O99.212 OBESITY AFFECTING PREGNANCY IN SECOND TRIMESTER: ICD-10-CM

## 2023-04-11 DIAGNOSIS — O24.410 DIET CONTROLLED GESTATIONAL DIABETES MELLITUS (GDM) IN SECOND TRIMESTER: ICD-10-CM

## 2023-04-11 DIAGNOSIS — O24.410 DIET CONTROLLED GESTATIONAL DIABETES MELLITUS (GDM) IN SECOND TRIMESTER: Primary | ICD-10-CM

## 2023-04-11 PROCEDURE — 76811 OB US DETAILED SNGL FETUS: CPT | Performed by: OBSTETRICS & GYNECOLOGY

## 2023-04-11 NOTE — PROGRESS NOTES
Felicitas Alta  : 9/15/1985 was seen for Gestational Diabetes Counseling: Individual  1:1 Persian speaking    Date: 2023   Start time: 2:45 pm End time: 3:45 pm        Education:     GDM Overview:  Reviewed gestational diabetes as diagnosis including target blood glucose values. Benefits, risks, and management options for improving/maintaining glucose control to mother/baby discussed. Instructed /demonstrated ability to perform blood glucose testing on: One touch verioL  Discussed monitoring ketones. Healthy Eating:  Discussed nutrition concepts for pregnancy/healthy eating and effects of food on BG value. Timing of meals; what is a carbohydrate, protein, fat. Taught: Carb counting, label reading, meal planning. Suggested Calorie Level: 2000    Being Active:  Benefits and effects of activity on BG discussed. Monitoring:  Instructed on how to use glucose monitor/proper lancet disposal. Testing schedules are:   Fastin-95 mg/dL, Call MD if >95 mg/dL twice in 1 week   2 Hour Post Prandial:  120 md/dL, Call MD if >120 mg/dL twice in 1 week. Taking Medication:  Reviewed when medication might be indicated. Reducing Risk:  Effects of elevated blood glucose on mother/baby reviewed. Discussed management (hyperglycemia, hypoglycemia, sick day, DKA, other) and when to call provider. Post pregnancy management/prevention of Type 2 DM, and increased risk of having diabetes later in life reviewed. Healthy Coping:  Family involvement/social support encouraged. Identification of lifestyle behaviors willing to change discussed. Training Tools Provided:  Printed materials covering each topic provided. Support Plan provided and reviewed. Patient Chosen Goals:      1. Follow recommended GDM meal plan. 2. Begin testing fasting glucose and 2 hours after meals   3. Bring glucose log to each MD office visit. 4. Encouraged activity if no restrictions.    5. Encouraged Felicitas Holm to call diabetes center with any questions or concerns. Patient verbalized understanding and has no further questions at this time.     Lianna Burgos RN,MSN

## 2023-04-12 ENCOUNTER — TELEPHONE (OUTPATIENT)
Dept: PERINATAL CARE | Facility: HOSPITAL | Age: 38
End: 2023-04-12

## 2023-04-12 NOTE — TELEPHONE ENCOUNTER
We did not discuss aneuploidy screening yesterday during our visit    Fetal Aneuploidy      Invasive Testing  I offered invasive genetic testing (amniocentesis, chorionic villus sampling) after reviewing the diagnostic accuracy of these tests as well as the procedure associated loss rate (1:500 for genetic amniocentesis). She ultimately does not desire invasive genetic testing. We discussed  the increased risk of chromosomal abnormalities associated with advanced maternal age at age  30year old. She understands that ultrasound exam cannot exclude potential genetic abnormalities. Her estimated risk based on maternal age at term with any chromosome abnormality is about 1: 200 and with Down Syndrome is about 1: 28. We also discussed the risks and benefits of having  genetic testing (CVS and amniocentesis) performed. Non-invasive Pregnancy Testing (NIPT)  I reviewed current non-invasive screening options. Currently non-invasive pregnancy testing (NIPT) offers the highest detection rate (with the lowest false positive rate) for the detection of fetal aneuploidy amongst high-risk patients. The limitations of detailed mid-trimester sonography was reviewed with the patient. First trimester screening and second trimester multiple-marker serum serum screening as alternative aneuploidy screening options were also reviewed. However, both of these tests are associated with lower detection and higher false positive rates. SHe is interested in the NIPT screen. We discussed that this is typically covered by insurance. She would like to come back to our office to have this drawn. I stated our office would call her to set up an appointment to have the blood draw.

## 2023-04-13 ENCOUNTER — TELEPHONE (OUTPATIENT)
Dept: PERINATAL CARE | Facility: HOSPITAL | Age: 38
End: 2023-04-13

## 2023-04-14 ENCOUNTER — TELEPHONE (OUTPATIENT)
Dept: PERINATAL CARE | Facility: HOSPITAL | Age: 38
End: 2023-04-14

## 2023-04-14 NOTE — TELEPHONE ENCOUNTER
Returned pt call RE scheduling.   No answer  Left Voice mail to call back with [facility] number  [phone]    [facility] using language line

## 2023-04-25 ENCOUNTER — HOSPITAL ENCOUNTER (OUTPATIENT)
Dept: ENDOCRINOLOGY | Age: 38
Discharge: HOME | End: 2023-04-25
Attending: OBSTETRICS & GYNECOLOGY
Payer: MEDICAID

## 2023-04-25 DIAGNOSIS — O24.410 DIET CONTROLLED GESTATIONAL DIABETES MELLITUS (GDM) IN SECOND TRIMESTER: Primary | ICD-10-CM

## 2023-04-25 NOTE — PROGRESS NOTES
Scarlett Martinez  : 9/15/1985 was seen for GDM  Individual Follow-Up Counseling    Date: 2023  Referring Provider:Dr Latrice Reyes Start time 3:30 pm End time: 4:00 pm    Assessment: LMP 2022 (Exact Date)   Weight: Wt Readings from Last 6 Encounters:  23 : 186 lb  23 : 185 lb  23 : 182 lb  23 : 182 lb 3.2 oz      Blood Glucose: FBG: within normal limits . PP: has has a few elevations post lunch and dinner: one at 132, and another at 148, 132. Ketones:     Gestational Diabetes goals assessed by patient: 2 - occasional, barriers to attaining goals discussed and additional modifications to goals made    Diet:     Following meal plan: no, had difficulty understanding which foods affect her glucose. Reviewed with educator   Skips: no meals skipped   Meals are Not balanced. Carb Intake is adequate. Protein Intake is adequate. Food Selections are CHO dense    Exercise:     Not doing exercise. Encouraged a daily walk jada one hour after eating     Education:     GDM Overview:  Reviewed target blood glucose values for GDM. Discussed benefits/risks to mother/baby management options to improve/maintain glucose control. Healthy Eating:  Reviewed/Reinforced:  Nutrition concepts for pregnancy/healthy eating and effect of food on blood glucose. Meal planning process and benefits of pre-planning meals/snacks. Appropriate timing of meals/snacks. Carb counting. Additional concepts: Increasing fiber    Being Active:  Reviewed benefits of effects of activity on BG values. Reviewed types of activity, duration, precautions. Monitoring:  Instructed to report readings to MD as directed. Call MD: if FBG is > 95 twice in 1 week. If 2 hr PP is > 120 twice in 1 week at any one meal.  Call Diabetic Educator is ketones are consistently elevated. Taking Medication:  Reviewed appropriate timing (if on insulin) of meds.    Reviewed probability of needing medication adjustments throughout pregnancy. Reducing Risk:  Discussed management of (hyperglycemia, hypoglycemia) and when to call provider. Recommendations:      1. Follow recommended meal plan. 2. Test blood glucose and ketones as directed. 3. Bring glucose log to each MD visit. 4. Start/continue activity if no restrictions. 5. Additional recommendations: review diet for GDM and follow up with [facility] in 2 weeks after making changes to diet. Lowering CHO and increasing protein   Patient was advised to forward her glucose logs to OB providers per GDM protocol. Patient verbalized understanding and has no further questions at this time.     Becky Calderon, RN,MNSN

## 2023-05-04 ENCOUNTER — ROUTINE PRENATAL (OUTPATIENT)
Dept: OBGYN CLINIC | Facility: CLINIC | Age: 38
End: 2023-05-04

## 2023-05-04 VITALS
WEIGHT: 188.19 LBS | HEART RATE: 85 BPM | BODY MASS INDEX: 34 KG/M2 | DIASTOLIC BLOOD PRESSURE: 76 MMHG | SYSTOLIC BLOOD PRESSURE: 131 MMHG

## 2023-05-04 DIAGNOSIS — Z34.82 ENCOUNTER FOR SUPERVISION OF OTHER NORMAL PREGNANCY IN SECOND TRIMESTER: Primary | ICD-10-CM

## 2023-05-04 LAB
APPEARANCE: CLEAR
BILIRUBIN: NEGATIVE
GLUCOSE (URINE DIPSTICK): NEGATIVE MG/DL
KETONES (URINE DIPSTICK): NEGATIVE MG/DL
LEUKOCYTES: NEGATIVE
MULTISTIX LOT#: NORMAL NUMERIC
NITRITE, URINE: NEGATIVE
OCCULT BLOOD: NEGATIVE
PH, URINE: 6.5 (ref 4.5–8)
PROTEIN (URINE DIPSTICK): NEGATIVE MG/DL
SPECIFIC GRAVITY: 1 (ref 1–1.03)
URINE-COLOR: YELLOW
UROBILINOGEN,SEMI-QN: 0.2 MG/DL (ref 0–1.9)

## 2023-05-04 PROCEDURE — 3078F DIAST BP <80 MM HG: CPT | Performed by: OBSTETRICS & GYNECOLOGY

## 2023-05-04 PROCEDURE — 3075F SYST BP GE 130 - 139MM HG: CPT | Performed by: OBSTETRICS & GYNECOLOGY

## 2023-05-04 PROCEDURE — 81002 URINALYSIS NONAUTO W/O SCOPE: CPT | Performed by: OBSTETRICS & GYNECOLOGY

## 2023-05-04 PROCEDURE — 0502F SUBSEQUENT PRENATAL CARE: CPT | Performed by: OBSTETRICS & GYNECOLOGY

## 2023-05-05 ENCOUNTER — LAB ENCOUNTER (OUTPATIENT)
Dept: LAB | Age: 38
End: 2023-05-05
Attending: OBSTETRICS & GYNECOLOGY
Payer: MEDICAID

## 2023-05-05 DIAGNOSIS — Z34.82 ENCOUNTER FOR SUPERVISION OF OTHER NORMAL PREGNANCY IN SECOND TRIMESTER: ICD-10-CM

## 2023-05-05 LAB
BASOPHILS # BLD AUTO: 0.03 X10(3) UL (ref 0–0.2)
BASOPHILS NFR BLD AUTO: 0.3 %
DEPRECATED HBV CORE AB SER IA-ACNC: 11.4 NG/ML
DEPRECATED RDW RBC AUTO: 40.2 FL (ref 35.1–46.3)
EOSINOPHIL # BLD AUTO: 0.28 X10(3) UL (ref 0–0.7)
EOSINOPHIL NFR BLD AUTO: 2.4 %
ERYTHROCYTE [DISTWIDTH] IN BLOOD BY AUTOMATED COUNT: 13.2 % (ref 11–15)
HCT VFR BLD AUTO: 32.6 %
HGB BLD-MCNC: 11 G/DL
IMM GRANULOCYTES # BLD AUTO: 0.08 X10(3) UL (ref 0–1)
IMM GRANULOCYTES NFR BLD: 0.7 %
LYMPHOCYTES # BLD AUTO: 1.99 X10(3) UL (ref 1–4)
LYMPHOCYTES NFR BLD AUTO: 16.8 %
MCH RBC QN AUTO: 28.4 PG (ref 26–34)
MCHC RBC AUTO-ENTMCNC: 33.7 G/DL (ref 31–37)
MCV RBC AUTO: 84.2 FL
MONOCYTES # BLD AUTO: 0.71 X10(3) UL (ref 0.1–1)
MONOCYTES NFR BLD AUTO: 6 %
NEUTROPHILS # BLD AUTO: 8.75 X10 (3) UL (ref 1.5–7.7)
NEUTROPHILS # BLD AUTO: 8.75 X10(3) UL (ref 1.5–7.7)
NEUTROPHILS NFR BLD AUTO: 73.8 %
PLATELET # BLD AUTO: 310 10(3)UL (ref 150–450)
RBC # BLD AUTO: 3.87 X10(6)UL
WBC # BLD AUTO: 11.8 X10(3) UL (ref 4–11)

## 2023-05-05 PROCEDURE — 85025 COMPLETE CBC W/AUTO DIFF WBC: CPT

## 2023-05-05 PROCEDURE — 82728 ASSAY OF FERRITIN: CPT

## 2023-05-05 PROCEDURE — 36415 COLL VENOUS BLD VENIPUNCTURE: CPT

## 2023-05-09 ENCOUNTER — TELEPHONE (OUTPATIENT)
Dept: OBGYN CLINIC | Facility: CLINIC | Age: 38
End: 2023-05-09

## 2023-05-09 ENCOUNTER — TELEPHONE (OUTPATIENT)
Dept: PERINATAL CARE | Facility: HOSPITAL | Age: 38
End: 2023-05-09

## 2023-05-09 DIAGNOSIS — O24.410 DIET CONTROLLED GESTATIONAL DIABETES MELLITUS (GDM) IN THIRD TRIMESTER: ICD-10-CM

## 2023-05-09 DIAGNOSIS — O09.529 ANTEPARTUM MULTIGRAVIDA OF ADVANCED MATERNAL AGE: Primary | ICD-10-CM

## 2023-05-09 RX ORDER — FERROUS SULFATE 325(65) MG
325 TABLET ORAL EVERY OTHER DAY
Qty: 90 TABLET | Refills: 3 | Status: SHIPPED | OUTPATIENT
Start: 2023-05-09

## 2023-05-09 NOTE — TELEPHONE ENCOUNTER
Pt is scheduled on 6/2 and 6/3    For GROWTHx2/BPP [id number]    DX AMA/EARLY GMDA1/BMI>34    Need PA

## 2023-05-09 NOTE — TELEPHONE ENCOUNTER
Patient name and  verified. Patient informed of MLM result note and recommendations. Patient requesting order for PO FE to be sent to pharmacy. Rx sent.

## 2023-05-12 ENCOUNTER — HOSPITAL ENCOUNTER (OUTPATIENT)
Dept: ENDOCRINOLOGY | Age: 38
Discharge: HOME | End: 2023-05-12
Attending: OBSTETRICS & GYNECOLOGY
Payer: MEDICAID

## 2023-05-12 DIAGNOSIS — O24.410 DIET CONTROLLED GESTATIONAL DIABETES MELLITUS (GDM) IN SECOND TRIMESTER: Primary | ICD-10-CM

## 2023-05-12 NOTE — PROGRESS NOTES
Scarlett Martinez  : 9/15/1985 was seen for GDM  Individual Follow-Up Counseling    Date: 2023  Referring Provider: Dr Yaima Spence  Start time: 1:45 pm End time: 2:15 pm    Assessment: LMP 2022 (Exact Date)   Weight: Wt Readings from Last 6 Encounters:  23 : 188 lb 3.2 oz  23 : 186 lb  23 : 185 lb  23 : 182 lb  23 : 182 lb 3.2 oz      Blood Glucose: All numbers have been within normal limits with the exception of a few PP after dinner. Patient states that she equates this with increased CHO intake and will try her best to stay within the GDM dietary guidelines. Gestational Diabetes goals assessed by patient: 4 - frequently, continue with current goals/plan    Diet:     Following meal plan: Yes/No: Yes  Skips: no meals skipped  Meals are Balanced. Carb Intake is adequate. Protein Intake is adequate. Food Selections are healthy. Exercise:     Walking     Education:     GDM Overview:  Reviewed target blood glucose values for GDM. Discussed benefits/risks to mother/baby management options to improve/maintain glucose control. Healthy Eating:  Reviewed/Reinforced:  Nutrition concepts for pregnancy/healthy eating and effect of food on blood glucose. Meal planning process and benefits of pre-planning meals/snacks. Appropriate timing of meals/snacks. Carb counting. Additional concepts: Increasing fiber    Being Active:  Reviewed benefits of effects of activity on BG values. Reviewed types of activity, duration, precautions. Monitoring:  Instructed to report readings to MD as directed. Call MD: if FBG is > 95 twice in 1 week. If 2 hr PP is > 120 twice in 1 week at any one meal.  Call Diabetic Educator is ketones are consistently elevated. Taking Medication:  Reviewed appropriate timing (if on insulin) of meds. Reviewed probability of needing medication adjustments throughout pregnancy.      Reducing Risk:  Discussed management of (hyperglycemia, hypoglycemia) and when to call provider. Recommendations:      1. Follow recommended meal plan. 2. Test blood glucose and ketones as directed. 3. Bring glucose log to each MD visit. 4. Start/continue activity if no restrictions. 5. Additional recommendations: continue to follow up with OB providers and provide glucose reports per protocol. Patient verbalized understanding and has no further questions at this time.     Becky Calderon RN,MSN,[facility]

## 2023-05-16 ENCOUNTER — ROUTINE PRENATAL (OUTPATIENT)
Dept: OBGYN CLINIC | Facility: CLINIC | Age: 38
End: 2023-05-16

## 2023-05-16 VITALS — SYSTOLIC BLOOD PRESSURE: 126 MMHG | BODY MASS INDEX: 35 KG/M2 | WEIGHT: 189 LBS | DIASTOLIC BLOOD PRESSURE: 65 MMHG

## 2023-05-16 DIAGNOSIS — O24.410 DIET CONTROLLED GESTATIONAL DIABETES MELLITUS (GDM), ANTEPARTUM: ICD-10-CM

## 2023-05-16 DIAGNOSIS — O34.219 PREVIOUS CESAREAN DELIVERY, ANTEPARTUM CONDITION OR COMPLICATION: ICD-10-CM

## 2023-05-16 DIAGNOSIS — Z30.09 CONSULTATION FOR FEMALE STERILIZATION: ICD-10-CM

## 2023-05-16 DIAGNOSIS — O99.210 OBESITY AFFECTING PREGNANCY, ANTEPARTUM: ICD-10-CM

## 2023-05-16 DIAGNOSIS — O09.529 AMA (ADVANCED MATERNAL AGE) MULTIGRAVIDA 35+, UNSPECIFIED TRIMESTER: Primary | ICD-10-CM

## 2023-05-16 DIAGNOSIS — Z34.82 ENCOUNTER FOR SUPERVISION OF OTHER NORMAL PREGNANCY IN SECOND TRIMESTER: ICD-10-CM

## 2023-05-16 LAB
APPEARANCE: CLEAR
BILIRUBIN: NEGATIVE
GLUCOSE (URINE DIPSTICK): NEGATIVE MG/DL
KETONES (URINE DIPSTICK): NEGATIVE MG/DL
LEUKOCYTES: NEGATIVE
MULTISTIX LOT#: NORMAL NUMERIC
NITRITE, URINE: NEGATIVE
OCCULT BLOOD: NEGATIVE
PH, URINE: 6.5 (ref 4.5–8)
PROTEIN (URINE DIPSTICK): NEGATIVE MG/DL
SPECIFIC GRAVITY: 1.01 (ref 1–1.03)
URINE-COLOR: YELLOW
UROBILINOGEN,SEMI-QN: 0.2 MG/DL (ref 0–1.9)

## 2023-05-16 PROCEDURE — 81002 URINALYSIS NONAUTO W/O SCOPE: CPT | Performed by: OBSTETRICS & GYNECOLOGY

## 2023-05-16 PROCEDURE — 3078F DIAST BP <80 MM HG: CPT | Performed by: OBSTETRICS & GYNECOLOGY

## 2023-05-16 PROCEDURE — 0502F SUBSEQUENT PRENATAL CARE: CPT | Performed by: OBSTETRICS & GYNECOLOGY

## 2023-05-16 PROCEDURE — 3074F SYST BP LT 130 MM HG: CPT | Performed by: OBSTETRICS & GYNECOLOGY

## 2023-05-31 ENCOUNTER — ROUTINE PRENATAL (OUTPATIENT)
Dept: OBGYN CLINIC | Facility: CLINIC | Age: 38
End: 2023-05-31

## 2023-05-31 VITALS
DIASTOLIC BLOOD PRESSURE: 79 MMHG | WEIGHT: 190 LBS | HEART RATE: 78 BPM | SYSTOLIC BLOOD PRESSURE: 124 MMHG | BODY MASS INDEX: 35 KG/M2

## 2023-05-31 DIAGNOSIS — O99.210 OBESITY AFFECTING PREGNANCY, ANTEPARTUM: ICD-10-CM

## 2023-05-31 DIAGNOSIS — O34.219 PREVIOUS CESAREAN DELIVERY, ANTEPARTUM CONDITION OR COMPLICATION: ICD-10-CM

## 2023-05-31 DIAGNOSIS — O09.529 AMA (ADVANCED MATERNAL AGE) MULTIGRAVIDA 35+, UNSPECIFIED TRIMESTER: Primary | ICD-10-CM

## 2023-05-31 DIAGNOSIS — O24.419 GESTATIONAL DIABETES MELLITUS (GDM), ANTEPARTUM, GESTATIONAL DIABETES METHOD OF CONTROL UNSPECIFIED: ICD-10-CM

## 2023-05-31 PROCEDURE — 90715 TDAP VACCINE 7 YRS/> IM: CPT | Performed by: OBSTETRICS & GYNECOLOGY

## 2023-05-31 PROCEDURE — 3074F SYST BP LT 130 MM HG: CPT | Performed by: OBSTETRICS & GYNECOLOGY

## 2023-05-31 PROCEDURE — 3078F DIAST BP <80 MM HG: CPT | Performed by: OBSTETRICS & GYNECOLOGY

## 2023-05-31 PROCEDURE — 0502F SUBSEQUENT PRENATAL CARE: CPT | Performed by: OBSTETRICS & GYNECOLOGY

## 2023-05-31 PROCEDURE — 90471 IMMUNIZATION ADMIN: CPT | Performed by: OBSTETRICS & GYNECOLOGY

## 2023-06-02 ENCOUNTER — HOSPITAL ENCOUNTER (OUTPATIENT)
Dept: PERINATAL CARE | Facility: HOSPITAL | Age: 38
Discharge: HOME | End: 2023-06-02
Attending: OBSTETRICS & GYNECOLOGY
Payer: MEDICAID

## 2023-06-02 VITALS
HEART RATE: 86 BPM | DIASTOLIC BLOOD PRESSURE: 70 MMHG | WEIGHT: 190 LBS | BODY MASS INDEX: 35 KG/M2 | SYSTOLIC BLOOD PRESSURE: 118 MMHG

## 2023-06-02 DIAGNOSIS — O24.410 DIET CONTROLLED GESTATIONAL DIABETES MELLITUS (GDM), ANTEPARTUM: ICD-10-CM

## 2023-06-02 DIAGNOSIS — O09.529 AMA (ADVANCED MATERNAL AGE) MULTIGRAVIDA 35+, UNSPECIFIED TRIMESTER: ICD-10-CM

## 2023-06-02 DIAGNOSIS — O99.210 OBESITY AFFECTING PREGNANCY, ANTEPARTUM: ICD-10-CM

## 2023-06-02 DIAGNOSIS — Z82.79 FAMILY HISTORY OF CONGENITAL ANOMALY: ICD-10-CM

## 2023-06-02 DIAGNOSIS — O24.410 DIET CONTROLLED GESTATIONAL DIABETES MELLITUS (GDM), ANTEPARTUM: Primary | ICD-10-CM

## 2023-06-02 PROCEDURE — 76816 OB US FOLLOW-UP PER FETUS: CPT | Performed by: OBSTETRICS & GYNECOLOGY

## 2023-06-02 PROCEDURE — 76819 FETAL BIOPHYS PROFIL W/O NST: CPT

## 2023-06-09 ENCOUNTER — TELEPHONE (OUTPATIENT)
Dept: PERINATAL CARE | Facility: HOSPITAL | Age: 38
End: 2023-06-09

## 2023-06-09 RX ORDER — INSULIN GLARGINE 100 [IU]/ML
6 INJECTION, SOLUTION SUBCUTANEOUS NIGHTLY
Qty: 15 ML | Refills: 0 | Status: SHIPPED | OUTPATIENT
Start: 2023-06-09

## 2023-06-09 RX ORDER — PEN NEEDLE, DIABETIC 31G X5/32"
1 NEEDLE, DISPOSABLE MISCELLANEOUS DAILY
Qty: 100 EACH | Refills: 1 | Status: SHIPPED | OUTPATIENT
Start: 2023-06-09

## 2023-06-09 NOTE — TELEPHONE ENCOUNTER
30w0d  Received BS log for date range  6/2-6/8     Low  High Out of Range   Fasting Blood Sugar 96 107 7/7   Post Breakfast 84 126 1/7   Post Lunch 81 114 0/7   Post Dinner 96 140 2/5   Diet

## 2023-06-15 ENCOUNTER — TELEPHONE (OUTPATIENT)
Dept: PERINATAL CARE | Facility: HOSPITAL | Age: 38
End: 2023-06-15

## 2023-06-15 NOTE — TELEPHONE ENCOUNTER
Spoke with pt through [facility] ([id number]) to discuss blood sugar log. Pt has not been consistently recording evening blood sugars but states she will do so. Pt also stated she has not started insulin because she did not have needles. [facility] contacted and needles ordered.  Left Mychart message for pt that needles are available and that we will reevaluate sugars in one week

## 2023-06-16 ENCOUNTER — ROUTINE PRENATAL (OUTPATIENT)
Dept: OBGYN CLINIC | Facility: CLINIC | Age: 38
End: 2023-06-16

## 2023-06-16 VITALS — SYSTOLIC BLOOD PRESSURE: 122 MMHG | DIASTOLIC BLOOD PRESSURE: 70 MMHG | BODY MASS INDEX: 35 KG/M2 | WEIGHT: 193 LBS

## 2023-06-16 DIAGNOSIS — Z34.83 ENCOUNTER FOR SUPERVISION OF OTHER NORMAL PREGNANCY IN THIRD TRIMESTER: Primary | ICD-10-CM

## 2023-06-16 LAB
APPEARANCE: CLEAR
BILIRUBIN: NEGATIVE
GLUCOSE (URINE DIPSTICK): NEGATIVE MG/DL
KETONES (URINE DIPSTICK): NEGATIVE MG/DL
LEUKOCYTES: NEGATIVE
MULTISTIX LOT#: NORMAL NUMERIC
NITRITE, URINE: NEGATIVE
OCCULT BLOOD: NEGATIVE
PH, URINE: 5 (ref 4.5–8)
PROTEIN (URINE DIPSTICK): NEGATIVE MG/DL
SPECIFIC GRAVITY: 1 (ref 1–1.03)
URINE-COLOR: YELLOW
UROBILINOGEN,SEMI-QN: 0.2 MG/DL (ref 0–1.9)

## 2023-06-16 PROCEDURE — 0502F SUBSEQUENT PRENATAL CARE: CPT | Performed by: OBSTETRICS & GYNECOLOGY

## 2023-06-16 PROCEDURE — 3078F DIAST BP <80 MM HG: CPT | Performed by: OBSTETRICS & GYNECOLOGY

## 2023-06-16 PROCEDURE — 3074F SYST BP LT 130 MM HG: CPT | Performed by: OBSTETRICS & GYNECOLOGY

## 2023-06-16 PROCEDURE — 81002 URINALYSIS NONAUTO W/O SCOPE: CPT | Performed by: OBSTETRICS & GYNECOLOGY

## 2023-06-16 NOTE — PROGRESS NOTES
Pt stated mfm just started her on 6 u nph tonight due to her blood sugars. Pt just spoke with bridget yesterday. Good fm. No c/o.

## 2023-06-23 ENCOUNTER — TELEPHONE (OUTPATIENT)
Dept: PERINATAL CARE | Facility: HOSPITAL | Age: 38
End: 2023-06-23

## 2023-06-30 ENCOUNTER — HOSPITAL ENCOUNTER (OUTPATIENT)
Dept: PERINATAL CARE | Facility: HOSPITAL | Age: 38
Discharge: HOME | End: 2023-06-30
Attending: OBSTETRICS & GYNECOLOGY
Payer: MEDICAID

## 2023-06-30 VITALS
DIASTOLIC BLOOD PRESSURE: 69 MMHG | BODY MASS INDEX: 35 KG/M2 | WEIGHT: 193 LBS | HEART RATE: 91 BPM | SYSTOLIC BLOOD PRESSURE: 127 MMHG

## 2023-06-30 DIAGNOSIS — O09.529 AMA (ADVANCED MATERNAL AGE) MULTIGRAVIDA 35+, UNSPECIFIED TRIMESTER: ICD-10-CM

## 2023-06-30 DIAGNOSIS — O99.210 OBESITY AFFECTING PREGNANCY, ANTEPARTUM: ICD-10-CM

## 2023-06-30 DIAGNOSIS — O24.410 DIET CONTROLLED GESTATIONAL DIABETES MELLITUS (GDM), ANTEPARTUM: ICD-10-CM

## 2023-06-30 DIAGNOSIS — Z82.79 FAMILY HISTORY OF CONGENITAL ANOMALY: ICD-10-CM

## 2023-06-30 DIAGNOSIS — O09.529 AMA (ADVANCED MATERNAL AGE) MULTIGRAVIDA 35+, UNSPECIFIED TRIMESTER: Primary | ICD-10-CM

## 2023-06-30 DIAGNOSIS — O24.414 INSULIN CONTROLLED GESTATIONAL DIABETES MELLITUS (GDM) IN THIRD TRIMESTER: ICD-10-CM

## 2023-06-30 PROCEDURE — 76816 OB US FOLLOW-UP PER FETUS: CPT | Performed by: OBSTETRICS & GYNECOLOGY

## 2023-06-30 PROCEDURE — 76819 FETAL BIOPHYS PROFIL W/O NST: CPT

## 2023-07-07 ENCOUNTER — TELEPHONE (OUTPATIENT)
Dept: PERINATAL CARE | Facility: HOSPITAL | Age: 38
End: 2023-07-07

## 2023-07-07 RX ORDER — INSULIN GLARGINE 100 [IU]/ML
22 INJECTION, SOLUTION SUBCUTANEOUS NIGHTLY
Qty: 5 ML | Refills: 1 | Status: SHIPPED | OUTPATIENT
Start: 2023-07-07

## 2023-07-07 NOTE — TELEPHONE ENCOUNTER
Pt  34 0/7 weeks  Blood glucose log from   6/27-7/5     Low  High Out of Range   Fasting Blood Sugar 90 103 6/7   Post Breakfast 80 102 0/7   Post Lunch 76 119 0/6   Post Dinner 102 134 3/7       Taking      Basaglar 10 u HS

## 2023-07-10 ENCOUNTER — PATIENT MESSAGE (OUTPATIENT)
Dept: OBGYN CLINIC | Facility: CLINIC | Age: 38
End: 2023-07-10

## 2023-07-11 ENCOUNTER — ROUTINE PRENATAL (OUTPATIENT)
Dept: OBGYN CLINIC | Facility: CLINIC | Age: 38
End: 2023-07-11

## 2023-07-11 VITALS
WEIGHT: 197 LBS | BODY MASS INDEX: 36 KG/M2 | HEART RATE: 82 BPM | SYSTOLIC BLOOD PRESSURE: 104 MMHG | DIASTOLIC BLOOD PRESSURE: 67 MMHG

## 2023-07-11 DIAGNOSIS — Z34.83 ENCOUNTER FOR SUPERVISION OF OTHER NORMAL PREGNANCY IN THIRD TRIMESTER: Primary | ICD-10-CM

## 2023-07-11 LAB
APPEARANCE: CLEAR
BILIRUBIN: NEGATIVE
GLUCOSE (URINE DIPSTICK): NEGATIVE MG/DL
KETONES (URINE DIPSTICK): NEGATIVE MG/DL
LEUKOCYTES: NEGATIVE
MULTISTIX LOT#: NORMAL NUMERIC
NITRITE, URINE: NEGATIVE
OCCULT BLOOD: NEGATIVE
PH, URINE: 7 (ref 4.5–8)
PROTEIN (URINE DIPSTICK): NEGATIVE MG/DL
SPECIFIC GRAVITY: 1.01 (ref 1–1.03)
URINE-COLOR: YELLOW
UROBILINOGEN,SEMI-QN: 0.2 MG/DL (ref 0–1.9)

## 2023-07-11 PROCEDURE — 0502F SUBSEQUENT PRENATAL CARE: CPT | Performed by: STUDENT IN AN ORGANIZED HEALTH CARE EDUCATION/TRAINING PROGRAM

## 2023-07-11 PROCEDURE — 3074F SYST BP LT 130 MM HG: CPT | Performed by: STUDENT IN AN ORGANIZED HEALTH CARE EDUCATION/TRAINING PROGRAM

## 2023-07-11 PROCEDURE — 81002 URINALYSIS NONAUTO W/O SCOPE: CPT | Performed by: STUDENT IN AN ORGANIZED HEALTH CARE EDUCATION/TRAINING PROGRAM

## 2023-07-11 PROCEDURE — 3078F DIAST BP <80 MM HG: CPT | Performed by: STUDENT IN AN ORGANIZED HEALTH CARE EDUCATION/TRAINING PROGRAM

## 2023-07-11 RX ORDER — TRIAMCINOLONE ACETONIDE 40 MG/ML
INJECTION, SUSPENSION INTRA-ARTICULAR; INTRAMUSCULAR
COMMUNITY
Start: 2023-07-05

## 2023-07-11 NOTE — PROGRESS NOTES
Pt is here for routine prenatal visit. No complaints or concerns. Denies any regular uterine contractions, spontaneous rupture membranes or vaginal bleeding. Patient feeling normal fetal movement. Currently on Basaglar 22 units at bedtime. Dose increased on , managed by MFM. Labs ordered, pt to complete at 35 weeks. Ultrasound and BPP with MFM on   OB ULTRASOUND REPORT   See imaging tab for complete ultrasound report or in PACS     Single IUP in cephalic presentation. Placenta is anterior. A 3 vessel cord is noted. Cardiac activity is present at 151 bpm  EFW 2508 g ( 5 lb 8 oz); 73%. MEGHANA is  18.1 cm. MVP is 5 cm           BIOPHYSICAL PROFILE:  Movement:    2/2  Tone:            2/2  Breathin/2  Fluid:             2/2  TOTAL:               Patient is scheduled for weekly NSTs starting on  with MFM.

## 2023-07-14 ENCOUNTER — TELEPHONE (OUTPATIENT)
Dept: PERINATAL CARE | Facility: HOSPITAL | Age: 38
End: 2023-07-14

## 2023-07-14 ENCOUNTER — LAB ENCOUNTER (OUTPATIENT)
Dept: LAB | Age: 38
End: 2023-07-14
Attending: STUDENT IN AN ORGANIZED HEALTH CARE EDUCATION/TRAINING PROGRAM
Payer: MEDICAID

## 2023-07-14 DIAGNOSIS — Z34.83 ENCOUNTER FOR SUPERVISION OF OTHER NORMAL PREGNANCY IN THIRD TRIMESTER: ICD-10-CM

## 2023-07-14 LAB
BASOPHILS # BLD AUTO: 0.04 X10(3) UL (ref 0–0.2)
BASOPHILS NFR BLD AUTO: 0.4 %
DEPRECATED HBV CORE AB SER IA-ACNC: 8.1 NG/ML
EOSINOPHIL # BLD AUTO: 0.3 X10(3) UL (ref 0–0.7)
EOSINOPHIL NFR BLD AUTO: 2.7 %
ERYTHROCYTE [DISTWIDTH] IN BLOOD BY AUTOMATED COUNT: 13.2 %
HCT VFR BLD AUTO: 32.8 %
HGB BLD-MCNC: 11 G/DL
IMM GRANULOCYTES # BLD AUTO: 0.14 X10(3) UL (ref 0–1)
IMM GRANULOCYTES NFR BLD: 1.2 %
LYMPHOCYTES # BLD AUTO: 2.37 X10(3) UL (ref 1–4)
LYMPHOCYTES NFR BLD AUTO: 21 %
MCH RBC QN AUTO: 28.3 PG (ref 26–34)
MCHC RBC AUTO-ENTMCNC: 33.5 G/DL (ref 31–37)
MCV RBC AUTO: 84.3 FL
MONOCYTES # BLD AUTO: 0.68 X10(3) UL (ref 0.1–1)
MONOCYTES NFR BLD AUTO: 6 %
NEUTROPHILS # BLD AUTO: 7.76 X10 (3) UL (ref 1.5–7.7)
NEUTROPHILS # BLD AUTO: 7.76 X10(3) UL (ref 1.5–7.7)
NEUTROPHILS NFR BLD AUTO: 68.7 %
PLATELET # BLD AUTO: 310 10(3)UL (ref 150–450)
RBC # BLD AUTO: 3.89 X10(6)UL
T PALLIDUM AB SER QL IA: NONREACTIVE
WBC # BLD AUTO: 11.3 X10(3) UL (ref 4–11)

## 2023-07-14 PROCEDURE — 87389 HIV-1 AG W/HIV-1&-2 AB AG IA: CPT

## 2023-07-14 PROCEDURE — 82728 ASSAY OF FERRITIN: CPT

## 2023-07-14 PROCEDURE — 36415 COLL VENOUS BLD VENIPUNCTURE: CPT

## 2023-07-14 PROCEDURE — 85025 COMPLETE CBC W/AUTO DIFF WBC: CPT

## 2023-07-14 PROCEDURE — 86780 TREPONEMA PALLIDUM: CPT

## 2023-07-14 RX ORDER — INSULIN GLARGINE 100 [IU]/ML
40 INJECTION, SOLUTION SUBCUTANEOUS NIGHTLY
Qty: 10 ML | Refills: 0 | Status: SHIPPED | OUTPATIENT
Start: 2023-07-14

## 2023-07-14 NOTE — TELEPHONE ENCOUNTER
Pt  35 0/7 weeks  Blood glucose log from  7/6-7/13     Low  High Out of Range   Fasting Blood Sugar 98 112 6/6   Post Breakfast 79 112 0/6   Post Lunch 101 119 0/6   Post Dinner 110 139 2/5       Taking     Basaglar 22 units at bedtime

## 2023-07-17 RX ORDER — TRIAMCINOLONE ACETONIDE 40 MG/ML
INJECTION, SUSPENSION INTRA-ARTICULAR; INTRAMUSCULAR
Refills: 0 | OUTPATIENT
Start: 2023-07-17

## 2023-07-21 ENCOUNTER — TELEPHONE (OUTPATIENT)
Dept: PERINATAL CARE | Facility: HOSPITAL | Age: 38
End: 2023-07-21

## 2023-07-21 NOTE — TELEPHONE ENCOUNTER
36w0d  Received BS log for date range 7/14-7/19       Low  High Out of Range   Fasting Blood Sugar 94 115 4/6   Post Breakfast 76 103 0/6   Post Lunch 99 155 1/6   Post Dinner 99 126 1/6        CHANGE        Basaglar 42 units at bedtime          DT

## 2023-07-21 NOTE — TELEPHONE ENCOUNTER
36w0d  Received BS log for date range 7/14-7/19     Low  High Out of Range   Fasting Blood Sugar 94 115 4/6   Post Breakfast 76 103 0/6   Post Lunch 99 155 1/6   Post Dinner 99 126 1/6         Basaglar 40 units at bedtime

## 2023-07-24 ENCOUNTER — APPOINTMENT (OUTPATIENT)
Dept: OBGYN CLINIC | Facility: HOSPITAL | Age: 38
End: 2023-07-24
Payer: MEDICAID

## 2023-07-24 ENCOUNTER — HOSPITAL ENCOUNTER (OUTPATIENT)
Facility: HOSPITAL | Age: 38
Discharge: HOME | End: 2023-07-24
Attending: OBSTETRICS & GYNECOLOGY | Admitting: OBSTETRICS & GYNECOLOGY
Payer: MEDICAID

## 2023-07-24 VITALS
TEMPERATURE: 98 F | DIASTOLIC BLOOD PRESSURE: 59 MMHG | HEART RATE: 80 BPM | RESPIRATION RATE: 18 BRPM | SYSTOLIC BLOOD PRESSURE: 113 MMHG

## 2023-07-24 DIAGNOSIS — Z34.90 PREGNANCY: ICD-10-CM

## 2023-07-24 PROCEDURE — 59025 FETAL NON-STRESS TEST: CPT

## 2023-07-25 NOTE — TRIAGE
[facility] - [facility]      Triage Note    Gabbi Patel Patient Status:  Outpatient in a Bed    9/15/1985 MRN [id number]   Location [address] Attending Donal Lazar MD   Hosp Day # 0 PCP No primary care provider on file. Vidal Kramer: [id number]  Estimated Date of Delivery: 23  Gestation: 36w3d    Chief Complaint    Non-stress Test         Allergies:  No Known Allergies    No orders of the defined types were placed in this encounter. Lab Results   Component Value Date    WBC 11.3 (H) 2023    HGB 11.0 (L) 2023    HCT 32.8 (L) 2023    .0 2023       Clinitek UA  Lab Results   Component Value Date    GLUUR 150 (A) 2023    SPECGRAVITY 1.010 2023    URINECUL No Growth at 18-24 hrs. 04/10/2023       UA  Lab Results   Component Value Date    COLORUR Light-Yellow 2023    CLARITY Clear 2023    SPECGRAVITY 1.010 2023    PROUR Negative 2023    GLUUR 150 (A) 2023    KETUR 40 (A) 2023    BILUR Negative 2023    BLOODURINE Negative 2023    NITRITE Negative 2023    UROBILINOGEN Normal 2023    LEUUR Negative 2023       There were no vitals filed for this visit.     NST  Variability: Moderate           Accelerations: Yes           Decelerations: None            Baseline: 130 BPM           Uterine Irritability: No           Contractions: Irregular                                        Acoustic Stimulator: No                                                Additional Comments       Patient presents with:  Non-stress Test        Tali Miller RN  2023 8:50 PM

## 2023-07-26 ENCOUNTER — ROUTINE PRENATAL (OUTPATIENT)
Dept: OBGYN CLINIC | Facility: CLINIC | Age: 38
End: 2023-07-26

## 2023-07-26 VITALS — DIASTOLIC BLOOD PRESSURE: 69 MMHG | WEIGHT: 198 LBS | SYSTOLIC BLOOD PRESSURE: 126 MMHG | BODY MASS INDEX: 36 KG/M2

## 2023-07-26 DIAGNOSIS — Z34.83 ENCOUNTER FOR SUPERVISION OF OTHER NORMAL PREGNANCY IN THIRD TRIMESTER: Primary | ICD-10-CM

## 2023-07-26 DIAGNOSIS — O24.414 INSULIN CONTROLLED GESTATIONAL DIABETES MELLITUS (GDM) DURING PREGNANCY, ANTEPARTUM: ICD-10-CM

## 2023-07-26 DIAGNOSIS — O34.219 PREVIOUS CESAREAN DELIVERY, ANTEPARTUM CONDITION OR COMPLICATION: ICD-10-CM

## 2023-07-26 DIAGNOSIS — O09.529 AMA (ADVANCED MATERNAL AGE) MULTIGRAVIDA 35+, UNSPECIFIED TRIMESTER: ICD-10-CM

## 2023-07-26 DIAGNOSIS — O99.210 OBESITY AFFECTING PREGNANCY, ANTEPARTUM, UNSPECIFIED OBESITY TYPE: ICD-10-CM

## 2023-07-26 LAB
APPEARANCE: CLEAR
BILIRUBIN: NEGATIVE
GLUCOSE (URINE DIPSTICK): NEGATIVE MG/DL
KETONES (URINE DIPSTICK): NEGATIVE MG/DL
MULTISTIX LOT#: ABNORMAL NUMERIC
NITRITE, URINE: NEGATIVE
OCCULT BLOOD: NEGATIVE
PH, URINE: 7 (ref 4.5–8)
PROTEIN (URINE DIPSTICK): NEGATIVE MG/DL
SPECIFIC GRAVITY: 1.01 (ref 1–1.03)
URINE-COLOR: YELLOW
UROBILINOGEN,SEMI-QN: 0.2 MG/DL (ref 0–1.9)

## 2023-07-26 PROCEDURE — 0502F SUBSEQUENT PRENATAL CARE: CPT | Performed by: OBSTETRICS & GYNECOLOGY

## 2023-07-26 PROCEDURE — 3074F SYST BP LT 130 MM HG: CPT | Performed by: OBSTETRICS & GYNECOLOGY

## 2023-07-26 PROCEDURE — 3078F DIAST BP <80 MM HG: CPT | Performed by: OBSTETRICS & GYNECOLOGY

## 2023-07-26 PROCEDURE — 81002 URINALYSIS NONAUTO W/O SCOPE: CPT | Performed by: OBSTETRICS & GYNECOLOGY

## 2023-07-26 NOTE — TELEPHONE ENCOUNTER
Please schedule patient for repeat C/S and bilateral salpingectomy on 8/4/23. Indication is prior C/S x 2 and GDM-A2.

## 2023-07-26 NOTE — TELEPHONE ENCOUNTER
Incoming call received from [place] with [facility]. FBC completely booked 8/4. Next available date is 8/3 at 0930.  Routing to provider for approval.

## 2023-07-26 NOTE — TELEPHONE ENCOUNTER
Patient name and  verified. Patient scheduled for repeat c/s and BS on 23 at 3:30 PM. Patient informed of scheduling details.  Routing to surgery pool for assist.

## 2023-07-28 ENCOUNTER — HOSPITAL ENCOUNTER (OUTPATIENT)
Dept: PERINATAL CARE | Facility: HOSPITAL | Age: 38
Discharge: HOME | End: 2023-07-28
Attending: OBSTETRICS & GYNECOLOGY
Payer: MEDICAID

## 2023-07-28 VITALS
BODY MASS INDEX: 36 KG/M2 | DIASTOLIC BLOOD PRESSURE: 73 MMHG | SYSTOLIC BLOOD PRESSURE: 113 MMHG | WEIGHT: 198 LBS | HEART RATE: 81 BPM

## 2023-07-28 DIAGNOSIS — E66.09 OTHER OBESITY DUE TO EXCESS CALORIES AFFECTING PREGNANCY, ANTEPARTUM: ICD-10-CM

## 2023-07-28 DIAGNOSIS — O24.414 INSULIN CONTROLLED GESTATIONAL DIABETES MELLITUS (GDM) DURING PREGNANCY, ANTEPARTUM: ICD-10-CM

## 2023-07-28 DIAGNOSIS — Z82.79 FAMILY HISTORY OF CONGENITAL ANOMALY: ICD-10-CM

## 2023-07-28 DIAGNOSIS — O09.529 AMA (ADVANCED MATERNAL AGE) MULTIGRAVIDA 35+, UNSPECIFIED TRIMESTER: ICD-10-CM

## 2023-07-28 DIAGNOSIS — O09.529 AMA (ADVANCED MATERNAL AGE) MULTIGRAVIDA 35+, UNSPECIFIED TRIMESTER: Primary | ICD-10-CM

## 2023-07-28 DIAGNOSIS — O99.210 OBESITY AFFECTING PREGNANCY, ANTEPARTUM: ICD-10-CM

## 2023-07-28 DIAGNOSIS — O99.210 OTHER OBESITY DUE TO EXCESS CALORIES AFFECTING PREGNANCY, ANTEPARTUM: ICD-10-CM

## 2023-07-28 DIAGNOSIS — O99.210 OBESITY AFFECTING PREGNANCY, ANTEPARTUM, UNSPECIFIED OBESITY TYPE: ICD-10-CM

## 2023-07-28 PROCEDURE — 76819 FETAL BIOPHYS PROFIL W/O NST: CPT

## 2023-07-28 PROCEDURE — 76816 OB US FOLLOW-UP PER FETUS: CPT | Performed by: OBSTETRICS & GYNECOLOGY

## 2023-07-29 ENCOUNTER — PATIENT MESSAGE (OUTPATIENT)
Dept: OBGYN CLINIC | Facility: CLINIC | Age: 38
End: 2023-07-29

## 2023-07-30 DIAGNOSIS — O24.410 DIET CONTROLLED GESTATIONAL DIABETES MELLITUS (GDM), ANTEPARTUM: ICD-10-CM

## 2023-07-30 NOTE — TELEPHONE ENCOUNTER
Pt scheduled for RC/S and bilateral salpingectomy at 930 on 8/5/2023. Dr. Rupa Diaz is on call. She will need an assist for this case.

## 2023-07-31 ENCOUNTER — TELEPHONE (OUTPATIENT)
Dept: OBGYN CLINIC | Facility: CLINIC | Age: 38
End: 2023-07-31

## 2023-07-31 RX ORDER — BLOOD SUGAR DIAGNOSTIC
STRIP MISCELLANEOUS
Qty: 100 STRIP | Refills: 0 | Status: SHIPPED | OUTPATIENT
Start: 2023-07-31

## 2023-07-31 NOTE — TELEPHONE ENCOUNTER
Bangladeshi only  Pt returning call, not sure who called her. Pt has a scheduled  and want to confirm date & time. She is seeing Friday & Saturday.     Pls advise

## 2023-07-31 NOTE — TELEPHONE ENCOUNTER
From: Kalli Odonnell  To: Tali Montez MD, MD  Sent: 7/29/2023 7:51 PM CDT  Subject: Vicky Lee consultar que efren exactamente Vicky Lee a realizar la cesarea porque llamaron que iba ser Marylouise Nipper 4 marce en my chart dice que seria sabado 5

## 2023-07-31 NOTE — TELEPHONE ENCOUNTER
Patient name and  verified. Patient informed of surgery schedule change. Aware of  appt details. Verbalized understanding.

## 2023-08-02 ENCOUNTER — TELEPHONE (OUTPATIENT)
Dept: OBGYN UNIT | Facility: HOSPITAL | Age: 38
End: 2023-08-02

## 2023-08-03 ENCOUNTER — ROUTINE PRENATAL (OUTPATIENT)
Dept: OBGYN CLINIC | Facility: CLINIC | Age: 38
End: 2023-08-03

## 2023-08-03 VITALS — BODY MASS INDEX: 36 KG/M2 | SYSTOLIC BLOOD PRESSURE: 121 MMHG | DIASTOLIC BLOOD PRESSURE: 79 MMHG | WEIGHT: 197 LBS

## 2023-08-03 DIAGNOSIS — Z34.83 ENCOUNTER FOR SUPERVISION OF OTHER NORMAL PREGNANCY IN THIRD TRIMESTER: Primary | ICD-10-CM

## 2023-08-03 DIAGNOSIS — O99.210 OBESITY AFFECTING PREGNANCY, ANTEPARTUM, UNSPECIFIED OBESITY TYPE: ICD-10-CM

## 2023-08-03 DIAGNOSIS — O09.529 AMA (ADVANCED MATERNAL AGE) MULTIGRAVIDA 35+, UNSPECIFIED TRIMESTER: ICD-10-CM

## 2023-08-03 NOTE — PROGRESS NOTES
Kick Counts and Labor precautions reviewed. C/s on Sat with Bilat salping   Patient was provided with informed consent for surgery including a review of the proposed surgery and all possibilities. A discussion of the risks of the procedure, benefits, side effects, and success were addressed. Alternative treatments were discussed as well. All questions were answered. Patient is to proceed with surgery.     Lab Results   Component Value Date    GBS Streptococcus agalactiae (Group B beta strep) (A) 07/26/2023       OB Limited ultrasound       NST -  140 + Accels, no decels, MLTV - Reactive category 1     MEGHANA - DVP today - 6.06     Fetal Position - VTX     Placenta - Ant Grade 3

## 2023-08-04 ENCOUNTER — HOSPITAL ENCOUNTER (OUTPATIENT)
Dept: PERINATAL CARE | Facility: HOSPITAL | Age: 38
Discharge: HOME | End: 2023-08-04
Attending: OBSTETRICS & GYNECOLOGY
Payer: MEDICAID

## 2023-08-04 VITALS — SYSTOLIC BLOOD PRESSURE: 115 MMHG | HEART RATE: 80 BPM | DIASTOLIC BLOOD PRESSURE: 78 MMHG

## 2023-08-04 DIAGNOSIS — O24.414 INSULIN CONTROLLED GESTATIONAL DIABETES MELLITUS (GDM) DURING PREGNANCY, ANTEPARTUM: ICD-10-CM

## 2023-08-04 DIAGNOSIS — O09.529 AMA (ADVANCED MATERNAL AGE) MULTIGRAVIDA 35+, UNSPECIFIED TRIMESTER: ICD-10-CM

## 2023-08-04 DIAGNOSIS — O99.210 OBESITY AFFECTING PREGNANCY, ANTEPARTUM, UNSPECIFIED OBESITY TYPE: Primary | ICD-10-CM

## 2023-08-04 PROCEDURE — 59025 FETAL NON-STRESS TEST: CPT

## 2023-08-05 ENCOUNTER — ANESTHESIA EVENT (OUTPATIENT)
Dept: OBGYN UNIT | Facility: HOSPITAL | Age: 38
End: 2023-08-05
Payer: MEDICAID

## 2023-08-05 ENCOUNTER — ANESTHESIA (OUTPATIENT)
Dept: OBGYN UNIT | Facility: HOSPITAL | Age: 38
End: 2023-08-05
Payer: MEDICAID

## 2023-08-05 ENCOUNTER — HOSPITAL ENCOUNTER (INPATIENT)
Facility: HOSPITAL | Age: 38
LOS: 3 days | Discharge: HOME | End: 2023-08-08
Attending: OBSTETRICS & GYNECOLOGY | Admitting: OBSTETRICS & GYNECOLOGY
Payer: MEDICAID

## 2023-08-05 PROBLEM — Z34.90 PREGNANCY: Status: ACTIVE | Noted: 2023-08-05

## 2023-08-05 PROBLEM — Z64.1 MULTIPARITY: Status: ACTIVE | Noted: 2023-08-05

## 2023-08-05 PROBLEM — Z34.90 PREGNANCY (HCC): Status: ACTIVE | Noted: 2023-08-05

## 2023-08-05 LAB
ANTIBODY SCREEN: NEGATIVE
BASOPHILS # BLD AUTO: 0.03 X10(3) UL (ref 0–0.2)
BASOPHILS NFR BLD AUTO: 0.3 %
DEPRECATED HBV CORE AB SER IA-ACNC: 21.2 NG/ML
DEPRECATED RDW RBC AUTO: 42 FL (ref 35.1–46.3)
EOSINOPHIL # BLD AUTO: 0.21 X10(3) UL (ref 0–0.7)
EOSINOPHIL NFR BLD AUTO: 1.8 %
ERYTHROCYTE [DISTWIDTH] IN BLOOD BY AUTOMATED COUNT: 14.3 % (ref 11–15)
GLUCOSE BLDC GLUCOMTR-MCNC: 99 MG/DL (ref 70–99)
HCT VFR BLD AUTO: 34.1 %
HGB BLD-MCNC: 11.6 G/DL
IMM GRANULOCYTES # BLD AUTO: 0.13 X10(3) UL (ref 0–1)
IMM GRANULOCYTES NFR BLD: 1.1 %
LYMPHOCYTES # BLD AUTO: 2.29 X10(3) UL (ref 1–4)
LYMPHOCYTES NFR BLD AUTO: 20 %
MCH RBC QN AUTO: 28.2 PG (ref 26–34)
MCHC RBC AUTO-ENTMCNC: 34 G/DL (ref 31–37)
MCV RBC AUTO: 82.8 FL
MONOCYTES # BLD AUTO: 0.68 X10(3) UL (ref 0.1–1)
MONOCYTES NFR BLD AUTO: 5.9 %
NEUTROPHILS # BLD AUTO: 8.09 X10 (3) UL (ref 1.5–7.7)
NEUTROPHILS # BLD AUTO: 8.09 X10(3) UL (ref 1.5–7.7)
NEUTROPHILS NFR BLD AUTO: 70.9 %
PLATELET # BLD AUTO: 286 10(3)UL (ref 150–450)
RBC # BLD AUTO: 4.12 X10(6)UL
RH BLOOD TYPE: POSITIVE
WBC # BLD AUTO: 11.4 X10(3) UL (ref 4–11)

## 2023-08-05 PROCEDURE — 0UT70ZZ RESECTION OF BILATERAL FALLOPIAN TUBES, OPEN APPROACH: ICD-10-PCS | Performed by: OBSTETRICS & GYNECOLOGY

## 2023-08-05 PROCEDURE — 59514 CESAREAN DELIVERY ONLY: CPT | Performed by: OBSTETRICS & GYNECOLOGY

## 2023-08-05 DEVICE — ABSORBABLE ADHESION BARRIER
Type: IMPLANTABLE DEVICE | Site: ABDOMEN | Status: FUNCTIONAL
Brand: GYNECARE INTERCEED

## 2023-08-05 RX ORDER — HALOPERIDOL 5 MG/ML
0.5 INJECTION INTRAMUSCULAR ONCE AS NEEDED
Status: ACTIVE | OUTPATIENT
Start: 2023-08-05 | End: 2023-08-05

## 2023-08-05 RX ORDER — ONDANSETRON 2 MG/ML
4 INJECTION INTRAMUSCULAR; INTRAVENOUS EVERY 6 HOURS PRN
Status: DISCONTINUED | OUTPATIENT
Start: 2023-08-05 | End: 2023-08-08

## 2023-08-05 RX ORDER — DIPHENHYDRAMINE HCL 25 MG
25 CAPSULE ORAL EVERY 4 HOURS PRN
Status: ACTIVE | OUTPATIENT
Start: 2023-08-05 | End: 2023-08-06

## 2023-08-05 RX ORDER — ONDANSETRON 2 MG/ML
INJECTION INTRAMUSCULAR; INTRAVENOUS AS NEEDED
Status: DISCONTINUED | OUTPATIENT
Start: 2023-08-05 | End: 2023-08-05 | Stop reason: SURG

## 2023-08-05 RX ORDER — PROCHLORPERAZINE EDISYLATE 5 MG/ML
5 INJECTION INTRAMUSCULAR; INTRAVENOUS ONCE AS NEEDED
Status: ACTIVE | OUTPATIENT
Start: 2023-08-05 | End: 2023-08-05

## 2023-08-05 RX ORDER — ONDANSETRON 2 MG/ML
4 INJECTION INTRAMUSCULAR; INTRAVENOUS EVERY 6 HOURS PRN
Status: DISCONTINUED | OUTPATIENT
Start: 2023-08-05 | End: 2023-08-05 | Stop reason: HOSPADM

## 2023-08-05 RX ORDER — AMMONIA INHALANTS 0.04 G/.3ML
0.3 INHALANT RESPIRATORY (INHALATION) AS NEEDED
Status: DISCONTINUED | OUTPATIENT
Start: 2023-08-05 | End: 2023-08-08

## 2023-08-05 RX ORDER — MORPHINE SULFATE 1 MG/ML
INJECTION, SOLUTION EPIDURAL; INTRATHECAL; INTRAVENOUS
Status: COMPLETED | OUTPATIENT
Start: 2023-08-05 | End: 2023-08-05

## 2023-08-05 RX ORDER — METOCLOPRAMIDE 10 MG/1
10 TABLET ORAL ONCE
Status: COMPLETED | OUTPATIENT
Start: 2023-08-05 | End: 2023-08-05

## 2023-08-05 RX ORDER — ONDANSETRON 2 MG/ML
4 INJECTION INTRAMUSCULAR; INTRAVENOUS ONCE AS NEEDED
Status: ACTIVE | OUTPATIENT
Start: 2023-08-05 | End: 2023-08-05

## 2023-08-05 RX ORDER — ACETAMINOPHEN 500 MG
1000 TABLET ORAL EVERY 6 HOURS
Status: DISCONTINUED | OUTPATIENT
Start: 2023-08-05 | End: 2023-08-08

## 2023-08-05 RX ORDER — CEFAZOLIN SODIUM/WATER 2 G/20 ML
2 SYRINGE (ML) INTRAVENOUS ONCE
Status: COMPLETED | OUTPATIENT
Start: 2023-08-05 | End: 2023-08-05

## 2023-08-05 RX ORDER — HYDROMORPHONE HYDROCHLORIDE 1 MG/ML
0.4 INJECTION, SOLUTION INTRAMUSCULAR; INTRAVENOUS; SUBCUTANEOUS
Status: ACTIVE | OUTPATIENT
Start: 2023-08-05 | End: 2023-08-06

## 2023-08-05 RX ORDER — SODIUM CHLORIDE, SODIUM LACTATE, POTASSIUM CHLORIDE, CALCIUM CHLORIDE 600; 310; 30; 20 MG/100ML; MG/100ML; MG/100ML; MG/100ML
125 INJECTION, SOLUTION INTRAVENOUS CONTINUOUS
Status: DISCONTINUED | OUTPATIENT
Start: 2023-08-05 | End: 2023-08-05 | Stop reason: HOSPADM

## 2023-08-05 RX ORDER — DIPHENHYDRAMINE HYDROCHLORIDE 50 MG/ML
12.5 INJECTION INTRAMUSCULAR; INTRAVENOUS EVERY 4 HOURS PRN
Status: DISPENSED | OUTPATIENT
Start: 2023-08-05 | End: 2023-08-06

## 2023-08-05 RX ORDER — BISACODYL 10 MG
10 SUPPOSITORY, RECTAL RECTAL ONCE AS NEEDED
Status: DISCONTINUED | OUTPATIENT
Start: 2023-08-05 | End: 2023-08-08

## 2023-08-05 RX ORDER — FAMOTIDINE 10 MG/ML
20 INJECTION, SOLUTION INTRAVENOUS ONCE
Status: COMPLETED | OUTPATIENT
Start: 2023-08-05 | End: 2023-08-05

## 2023-08-05 RX ORDER — BUPIVACAINE HYDROCHLORIDE 7.5 MG/ML
INJECTION, SOLUTION INTRASPINAL
Status: COMPLETED | OUTPATIENT
Start: 2023-08-05 | End: 2023-08-05

## 2023-08-05 RX ORDER — ONDANSETRON 2 MG/ML
4 INJECTION INTRAMUSCULAR; INTRAVENOUS ONCE AS NEEDED
Status: DISCONTINUED | OUTPATIENT
Start: 2023-08-05 | End: 2023-08-05

## 2023-08-05 RX ORDER — SODIUM CHLORIDE, SODIUM LACTATE, POTASSIUM CHLORIDE, CALCIUM CHLORIDE 600; 310; 30; 20 MG/100ML; MG/100ML; MG/100ML; MG/100ML
INJECTION, SOLUTION INTRAVENOUS CONTINUOUS
Status: DISCONTINUED | OUTPATIENT
Start: 2023-08-05 | End: 2023-08-05

## 2023-08-05 RX ORDER — NALBUPHINE HYDROCHLORIDE 10 MG/ML
2.5 INJECTION, SOLUTION INTRAMUSCULAR; INTRAVENOUS; SUBCUTANEOUS
Status: DISCONTINUED | OUTPATIENT
Start: 2023-08-05 | End: 2023-08-05

## 2023-08-05 RX ORDER — METOCLOPRAMIDE HYDROCHLORIDE 5 MG/ML
10 INJECTION INTRAMUSCULAR; INTRAVENOUS ONCE
Status: COMPLETED | OUTPATIENT
Start: 2023-08-05 | End: 2023-08-05

## 2023-08-05 RX ORDER — OXYCODONE HYDROCHLORIDE 5 MG/1
5 TABLET ORAL EVERY 6 HOURS PRN
Status: DISCONTINUED | OUTPATIENT
Start: 2023-08-05 | End: 2023-08-08

## 2023-08-05 RX ORDER — ACETAMINOPHEN 500 MG
1000 TABLET ORAL ONCE
Status: COMPLETED | OUTPATIENT
Start: 2023-08-05 | End: 2023-08-05

## 2023-08-05 RX ORDER — SIMETHICONE 80 MG
80 TABLET,CHEWABLE ORAL 3 TIMES DAILY PRN
Status: DISCONTINUED | OUTPATIENT
Start: 2023-08-05 | End: 2023-08-08

## 2023-08-05 RX ORDER — PHENYLEPHRINE HCL 10 MG/ML
VIAL (ML) INJECTION AS NEEDED
Status: DISCONTINUED | OUTPATIENT
Start: 2023-08-05 | End: 2023-08-05 | Stop reason: SURG

## 2023-08-05 RX ORDER — NALOXONE HYDROCHLORIDE 0.4 MG/ML
0.08 INJECTION, SOLUTION INTRAMUSCULAR; INTRAVENOUS; SUBCUTANEOUS
Status: ACTIVE | OUTPATIENT
Start: 2023-08-05 | End: 2023-08-06

## 2023-08-05 RX ORDER — ACETAMINOPHEN 325 MG/1
650 TABLET ORAL EVERY 6 HOURS PRN
Status: DISPENSED | OUTPATIENT
Start: 2023-08-05 | End: 2023-08-06

## 2023-08-05 RX ORDER — HYDROCODONE BITARTRATE AND ACETAMINOPHEN 7.5; 325 MG/1; MG/1
1 TABLET ORAL EVERY 6 HOURS PRN
Status: ACTIVE | OUTPATIENT
Start: 2023-08-05 | End: 2023-08-06

## 2023-08-05 RX ORDER — FAMOTIDINE 20 MG/1
20 TABLET, FILM COATED ORAL ONCE
Status: COMPLETED | OUTPATIENT
Start: 2023-08-05 | End: 2023-08-05

## 2023-08-05 RX ORDER — GABAPENTIN 300 MG/1
300 CAPSULE ORAL EVERY 8 HOURS PRN
Status: DISCONTINUED | OUTPATIENT
Start: 2023-08-05 | End: 2023-08-08

## 2023-08-05 RX ORDER — DEXTROSE, SODIUM CHLORIDE, SODIUM LACTATE, POTASSIUM CHLORIDE, AND CALCIUM CHLORIDE 5; .6; .31; .03; .02 G/100ML; G/100ML; G/100ML; G/100ML; G/100ML
INJECTION, SOLUTION INTRAVENOUS CONTINUOUS
Status: DISCONTINUED | OUTPATIENT
Start: 2023-08-05 | End: 2023-08-08

## 2023-08-05 RX ORDER — HYDROCODONE BITARTRATE AND ACETAMINOPHEN 7.5; 325 MG/1; MG/1
2 TABLET ORAL EVERY 6 HOURS PRN
Status: ACTIVE | OUTPATIENT
Start: 2023-08-05 | End: 2023-08-06

## 2023-08-05 RX ORDER — GLYCOPYRROLATE 0.2 MG/ML
INJECTION, SOLUTION INTRAMUSCULAR; INTRAVENOUS AS NEEDED
Status: DISCONTINUED | OUTPATIENT
Start: 2023-08-05 | End: 2023-08-05 | Stop reason: SURG

## 2023-08-05 RX ORDER — KETOROLAC TROMETHAMINE 30 MG/ML
30 INJECTION, SOLUTION INTRAMUSCULAR; INTRAVENOUS EVERY 6 HOURS
Status: DISPENSED | OUTPATIENT
Start: 2023-08-05 | End: 2023-08-06

## 2023-08-05 RX ORDER — KETOROLAC TROMETHAMINE 30 MG/ML
30 INJECTION, SOLUTION INTRAMUSCULAR; INTRAVENOUS ONCE AS NEEDED
Status: COMPLETED | OUTPATIENT
Start: 2023-08-05 | End: 2023-08-05

## 2023-08-05 RX ORDER — IBUPROFEN 600 MG/1
600 TABLET ORAL EVERY 6 HOURS
Status: DISCONTINUED | OUTPATIENT
Start: 2023-08-06 | End: 2023-08-08

## 2023-08-05 RX ORDER — TRISODIUM CITRATE DIHYDRATE AND CITRIC ACID MONOHYDRATE 500; 334 MG/5ML; MG/5ML
30 SOLUTION ORAL ONCE
Status: COMPLETED | OUTPATIENT
Start: 2023-08-05 | End: 2023-08-05

## 2023-08-05 RX ORDER — DIPHENHYDRAMINE HYDROCHLORIDE 50 MG/ML
25 INJECTION INTRAMUSCULAR; INTRAVENOUS ONCE AS NEEDED
Status: COMPLETED | OUTPATIENT
Start: 2023-08-05 | End: 2023-08-05

## 2023-08-05 RX ORDER — DOCUSATE SODIUM 100 MG/1
100 CAPSULE, LIQUID FILLED ORAL
Status: DISCONTINUED | OUTPATIENT
Start: 2023-08-05 | End: 2023-08-08

## 2023-08-05 RX ORDER — HYDROMORPHONE HYDROCHLORIDE 1 MG/ML
0.6 INJECTION, SOLUTION INTRAMUSCULAR; INTRAVENOUS; SUBCUTANEOUS
Status: ACTIVE | OUTPATIENT
Start: 2023-08-05 | End: 2023-08-06

## 2023-08-05 RX ORDER — NALBUPHINE HYDROCHLORIDE 10 MG/ML
2.5 INJECTION, SOLUTION INTRAMUSCULAR; INTRAVENOUS; SUBCUTANEOUS EVERY 4 HOURS PRN
Status: DISPENSED | OUTPATIENT
Start: 2023-08-05 | End: 2023-08-06

## 2023-08-05 RX ADMIN — PHENYLEPHRINE HCL 200 MCG: 10 MG/ML VIAL (ML) INJECTION at 09:54:00

## 2023-08-05 RX ADMIN — MORPHINE SULFATE 0.3 MG: 1 INJECTION, SOLUTION EPIDURAL; INTRATHECAL; INTRAVENOUS at 09:39:00

## 2023-08-05 RX ADMIN — GLYCOPYRROLATE 0.2 MG: 0.2 INJECTION, SOLUTION INTRAMUSCULAR; INTRAVENOUS at 09:47:00

## 2023-08-05 RX ADMIN — PHENYLEPHRINE HCL 400 MCG: 10 MG/ML VIAL (ML) INJECTION at 09:45:00

## 2023-08-05 RX ADMIN — BUPIVACAINE HYDROCHLORIDE 1.5 ML: 7.5 INJECTION, SOLUTION INTRASPINAL at 09:39:00

## 2023-08-05 RX ADMIN — SODIUM CHLORIDE, SODIUM LACTATE, POTASSIUM CHLORIDE, CALCIUM CHLORIDE: 600; 310; 30; 20 INJECTION, SOLUTION INTRAVENOUS at 09:34:00

## 2023-08-05 RX ADMIN — PHENYLEPHRINE HCL 200 MCG: 10 MG/ML VIAL (ML) INJECTION at 10:03:00

## 2023-08-05 RX ADMIN — ONDANSETRON 4 MG: 2 INJECTION INTRAMUSCULAR; INTRAVENOUS at 10:20:00

## 2023-08-05 RX ADMIN — SODIUM CHLORIDE, SODIUM LACTATE, POTASSIUM CHLORIDE, CALCIUM CHLORIDE: 600; 310; 30; 20 INJECTION, SOLUTION INTRAVENOUS at 10:34:00

## 2023-08-05 RX ADMIN — CEFAZOLIN SODIUM/WATER 2 G: 2 G/20 ML SYRINGE (ML) INTRAVENOUS at 09:43:00

## 2023-08-05 RX ADMIN — SODIUM CHLORIDE, SODIUM LACTATE, POTASSIUM CHLORIDE, CALCIUM CHLORIDE: 600; 310; 30; 20 INJECTION, SOLUTION INTRAVENOUS at 09:47:00

## 2023-08-05 RX ADMIN — PHENYLEPHRINE HCL 200 MCG: 10 MG/ML VIAL (ML) INJECTION at 09:47:00

## 2023-08-05 RX ADMIN — SODIUM CHLORIDE, SODIUM LACTATE, POTASSIUM CHLORIDE, CALCIUM CHLORIDE: 600; 310; 30; 20 INJECTION, SOLUTION INTRAVENOUS at 10:19:00

## 2023-08-05 NOTE — ANESTHESIA PROCEDURE NOTES
Spinal Block    Date/Time: 8/5/2023 9:39 AM    Performed by: Ralph Diaz MD  Authorized by: Ralph Diaz MD      General Information and Staff    Start Time:  8/5/2023 9:39 AM  End Time:  8/5/2023 9:40 AM  Anesthesiologist:  Ralph Diaz MD  Performed by:   Anesthesiologist  Patient Location:  OB  Site identification: surface landmarks    Reason for Block: at surgeon's request, post-op pain management and surgical anesthesia    Preanesthetic Checklist: patient identified, IV checked, risks and benefits discussed, monitors and equipment checked, pre-op evaluation, timeout performed, anesthesia consent and sterile technique used      Procedure Details    Patient Position:  Sitting  Prep: ChloraPrep    Monitoring:  Cardiac monitor  Approach:  Midline  Location:  L4-5  Injection Technique:  Single-shot    Needle    Needle Type:  Pencil-tip  Needle Gauge:  24 G  Needle Length:  3.5 in    Assessment    Sensory Level:  T4  Events: clear CSF, CSF aspirated, well tolerated and blood negative      Additional Comments

## 2023-08-05 NOTE — ANESTHESIA PREPROCEDURE EVALUATION
Anesthesia PreOp Note    HPI:     Kimberly Sarabia is a 30year old female who presents for preoperative consultation requested by: Adriel Lopez MD    Date of Surgery: 2023    Procedure(s):   SECTION  BILATERAL SALPINGECTOMY  Indication: filomena ////GD A2//repeat    Relevant Problems   No relevant active problems       NPO:                         History Review:  Patient Active Problem List    Previous  delivery, antepartum condition or complication         Date Noted: 2023      Consultation for female sterilization         Date Noted: 2023      AMA (advanced maternal age) multigravida 33+, unspecified trimester         Date Noted: 2023      Insulin controlled gestational diabetes mellitus (GDM) during pregnancy, antepartum         Date Noted: 2023      Obesity affecting pregnancy, antepartum         Date Noted: 2023      Family history of congenital anomaly         Date Noted: 2023      Abdominal pain during pregnancy         Date Noted: 2018        Past Medical History:   Diagnosis Date    Anemia     Gestational diabetes        Past Surgical History:   Procedure Laterality Date             No medications prior to admission. No current Epic-ordered facility-administered medications on file. Glucose Blood (ONETOUCH VERIO) In Vitro Strip, Use four times daily in the morning and two hours after each meal, Disp: 100 strip, Rfl: 0  insulin glargine (BASAGLAR KWIKPEN) 100 UNIT/ML Subcutaneous Solution Pen-injector, Inject 40 Units into the skin nightly. (Patient taking differently: Inject 42 Units into the skin nightly.), Disp: 10 mL, Rfl: 0  triamcinolone acetonide 40 MG/ML Injection Suspension, Inject 1 mL by tendon sheath injection route for 1 dose. (Patient not taking: Reported on 2023), Disp: , Rfl:   Insulin Pen Needle (COMFORT TOUCH INSULIN PEN NEED) 31G X 4 MM Does not apply Misc, 1 Needle daily.  Pt taking insulin 1 time per day, Disp: 100 each, Rfl: 1  Ferrous Sulfate 325 (65 Fe) MG Oral Tab, Take 1 tablet (325 mg total) by mouth every other day., Disp: 90 tablet, Rfl: 3  Lancets (ONETOUCH DELICA PLUS [id number]) Does not apply Misc, 4 each 4 (four) times daily. Test morning and 2 hours after each meal., Disp: 100 each, Rfl: 3  Blood Glucose Monitoring Suppl ([address]) w/Device Does not apply Kit, 1 kit 4 (four) times daily. May substitute per insurance formulary, Disp: 1 kit, Rfl: 0  Aspirin 81 MG Oral Cap, Take 1 tablet by mouth daily. , Disp: , Rfl:   PRENATAL 27-0.8 MG Oral Tab, Take 1 tablet by mouth daily. , Disp: , Rfl:         No Known Allergies    Family History   Problem Relation Age of Onset    Other (Other) Father     Arthritis Mother     No Known Problems Maternal Grandmother     No Known Problems Maternal Grandfather     No Known Problems Paternal Grandmother     No Known Problems Paternal Grandfather      Social History    Socioeconomic History      Marital status:     Tobacco Use      Smoking status: Never      Smokeless tobacco: Never    Substance and Sexual Activity      Alcohol use: Yes        Comment: occasional      Drug use: Never      Available pre-op labs reviewed. Lab Results   Component Value Date    WBC 11.3 (H) 07/14/2023    RBC 3.89 07/14/2023    HGB 11.0 (L) 07/14/2023    HCT 32.8 (L) 07/14/2023    MCV 84.3 07/14/2023    MCH 28.3 07/14/2023    MCHC 33.5 07/14/2023    RDW 13.2 07/14/2023    .0 07/14/2023             Vital Signs: There is no height or weight on file to calculate BMI.   vitals were not taken for this visit. There were no vitals filed for this visit.      Anesthesia Evaluation     Patient summary reviewed and Nursing notes reviewed    No history of anesthetic complications   Airway   Mallampati: II  TM distance: >3 FB  Neck ROM: full  Dental      Pulmonary - negative ROS    breath sounds clear to auscultation  (-) COPD, asthma, sleep apnea  Cardiovascular - negative ROS  Exercise tolerance: good  (-) hypertension, CAD, CHF    Rhythm: regular  Rate: normal    Neuro/Psych - negative ROS   (-) CVA    GI/Hepatic/Renal - negative ROS   (-) GERD, liver disease, renal disease    Endo/Other    (+) diabetes mellitus using insulin  (-) hypothyroidism  Abdominal   (+) obese                 Anesthesia Plan:   ASA:  3  Plan:   Spinal  Post-op Pain Management: Intrathecal narcotics, Oral pain medication and IV analgesics  Plan Comments: Risks of spinal including infection, hematoma, nerve damage, PDPH and risks of GA including N/V, sore throat, respiratory/cardiac compromise explained to pt and pt voiced understanding; all questions answered. Informed Consent Plan and Risks Discussed With:  Patient  Discussed plan with:  Surgeon      I have informed Yuan Manzano and/or legal guardian or family member of the nature of the anesthetic plan, benefits, risks including possible dental damage if relevant, major complications, and any alternative forms of anesthetic management. All of the patient's questions were answered to the best of my ability. The patient desires the anesthetic management as planned.   Teodoro Mason MD  8/5/2023 7:15 AM  Present on Admission:  **None**

## 2023-08-05 NOTE — L&D DELIVERY NOTE
[facility]     Section Delivery Note    Christiana Comfort Patient Status:  Inpatient    9/15/1985 MRN [id number]   Location [address] Attending Rahul Villafana MD   Hosp Day # 0 PCP No primary care provider on file. Pre Op Diagnosis: history of prior  section and multiparity   Post Op Diagnosis: same as pre-op  Procedure:   Surgical/Procedural Cases on this Admission       Case IDs Date Procedure Surgeon Location Status    [id number] 23  SECTION Rahul Villafana  [address] L+D OR Can    [id number] 23  SECTION WITH BILATERAL SALPINGECTOMY Rahul Villafana  [address] L+D OR [place]          LTCS and Bilateral Salpingectomy  Surgeon:  Jimmie Kussmaul, MD  Assistant Surgeon:  Sherif Rivera DO    Anesthesia: spinal   IVF: See Nursing documentation   EBL: See Nursing documentation   UO: See Nursing documentation   Complications: none  Neonatologist Present: yes  Findings: normal uterus, normal tubes and normal ovaries  Specimen: placenta   Infant  Date of Delivery: 2023   Time of Delivery: 10:05 AM  Infant Sex: Information for the patient's : Margaret Pod Girl [[id number]]   female  Infant Birthweight: Information for the patient's : Calvin Monteiro, Girl [[id number]]   7 lb 0.2 oz (3.18 kg)   Apgars:  1 minute: 8               5 minutes: 9                        10 minutes:        After obtaining consent, the patient was taken to the operative suite where she was administered spinal anesthesia. Fetal heart tones were obtained. Mendoza catheter was anchored and the patient was prepped and draped in a sterile fashion. Time-out was performed. Anesthesia was checked and found to be adequate. A Pfannenstiel skin incision was made and carried down sharply to the rectus fascia. The rectus fascia was incised in midline and extended bluntly. Peritoneum was identified and entered bluntly. A Mobius retractor was then placed.       In the lower uterine segment, a low transverse incision was made with a scalpel and extended bluntly. Amniotomy was performed and clear fluid was noted. The infant was delivered in the vertex presentation without difficulty. Infant was vigorous and crying on the surgical field. Delayed cord clamping x 30 seconds was performed. Cord doubly clamped and cut. Infant was taken to the  team in attendance. Placenta was then extracted from the uterus manually after collecting cord blood. Moist lap sponges were used to remove any remaining clots from the uterus. The hysterotomy was then grasped with ring forceps and reapproximated using a #1 Chromic suture in a running locked fashion. Figure of 8 sutures of  #1 Chromic suture was used PRN to achieve excellent hemostasis. Lateral gutters were wiped clear free of blood clot. Tubes and ovaries were inspected and normal.  Hysterotomy was inspected for a final time and again hemostatic. Interceed was then placed over the hysterotomy. Subfascial tissue was inspected and hemostatic. The fascia was then reapproximated using a 0 looped PDS suture in a running fashion. Subcutaneous tissue was irrigated, wiped clean of blood and clot and bovie used PRN to achieve hemostasis. The subcutaneous tissue was closed using a 0 plain suture in a running fashion. The skin was then closed using a subcuticular stitch with a 4-0 Vicryl. Skin glue was placed over the incision. Patient tolerated the procedure well. She was transferred to recovery room in stable condition. There were no immediate complications. Dr. Katie Haq was present for the entire procedure and assisted with dissection in the abdominal cavity, delivery of the baby and the repair of the abdominal cavity.      Ben Nacho Girl [[id number]]      Labor Events     labor?: No   steroids?: None  Antibiotics received during labor?: No  Rupture date/time: 2023 1005     Rupture type: AROM  Fluid color: Clear  Intrapartum & labor complications: None  Intrapartum & labor complications comment: Repeat c/s        Labor Length    3rd stage: 0h 00m       [place] Presentation    Presentation: Vertex       Operative Delivery    Operative Vaginal Delivery: N/A               Shoulder Dystocia    Shoulder Dystocia: N/A       Anesthesia    Method: Spinal   Analgesics:  Analgesics   DURAMORPH IJ              Delivery      Head delivery date/time: 2023 10:05:17   Delivery date/time:  23 10:05:00   Delivery type: Caesarean Section    Details:  Trial of labor after : No    categorization: Repeat    priority: scheduled   Indications for : Prior Uterine Surgery   Skin incision type: 2 Midline   Uterine Incision type: Low Transverse             Delivery location: OR     Delivery Room Temperature: 69          Delivery Providers    Delivering Clinician: Cody Davila MD   Delivery personnel:  Provider Role   [facility], [facility] [address], RN Baby Nurse   Betsy Davis, RN Delivery Nurse   Sun Beauchampork Surgical Tech   [place] Pas[place]ymberly Blank, APRN Nurse Practitioner   Virgilio Morrison MD Neonatologist   Constanza Vaz, DO Surgeon             Cord    Vessels: 3 Vessels  Complications: None  Timed cord clamping: Yes  Time in sec: 30  Cord blood disposition: to lab  Gases sent?: No       Resuscitation    Method: None        Measurements      Weight: 3180 g 7 lb 0.2 oz Length: 50.8 cm     Head circum. : 34 cm              Placenta    Date/time: 2023 1005  Removal: Manual Removal  Appearance: Intact  Disposition: Discarded       Apgars    Living status: Living   Apgar Scoring Key:    0 1 2    Skin color Blue or pale Acrocyanotic Completely pink    Heart rate Absent <100 bpm >100 bpm    Reflex irritability No response Grimace Cry or active withdrawal    Muscle tone Limp Some flexion Active motion    Respiratory effort Absent Weak cry; hypoventilation Good, crying                1 Minute:  5 Minute:  10 Minute:  15 Minute:  20 Minute:      Skin color: 0  1       Heart rate: 2  2       Reflex irritablity: 2  2       Muscle tone: 2  2       Respiratory effort: 2  2       Total: 8  9               Apgars assigned by: Nelly Soulier     [place] disposition: with mother             Skin to Skin    Skin to skin initiated date/time: 2023 1040  Skin to skin with:  Mother       Vaginal Count    No data filed       Delivery (Maternal)    Episiotomy: None  Perineal lacerations: None      Vaginal laceration?: No      Cervical laceration?: No    Clitoral laceration?: No    Quantitative blood loss (mL): 332

## 2023-08-05 NOTE — PROGRESS NOTES
Patient transferred to mother/baby room 354 per cart in stable condition with baby and personal belongings. Accompanied by  and staff. Report given to [facility] mother/baby RN.

## 2023-08-05 NOTE — H&P
HISTORY AND PHYSICAL                                         Chief Complaint:  Scheduled C/S      History of Present Illness:    [place] is a  30year old y/o   @  38w1d wks presents for Scheduled C/S.    Patient's GBS is  Positive     Lab Results   Component Value Date    GBS Streptococcus agalactiae (Group B beta strep) (A) 2023       Past Medical History:   Diagnosis Date    Anemia     Gestational diabetes        Past Surgical History:   Procedure Laterality Date             OB History     T2    L2    SAB0  IAB0  Ectopic0  Multiple0  Live Births2       No Known Allergies    Social History    Socioeconomic History      Marital status:     Tobacco Use      Smoking status: Never      Smokeless tobacco: Never    Substance and Sexual Activity      Alcohol use: Yes        Comment: occasional      Drug use: Never      Family History   Problem Relation Age of Onset    Other (Other) Father     Arthritis Mother     No Known Problems Maternal Grandmother     No Known Problems Maternal Grandfather     No Known Problems Paternal Grandmother     No Known Problems Paternal Grandfather          Current Outpatient Medications   Medication Instructions    Aspirin 81 MG Oral Cap 1 tablet, Oral, Daily    Basaglar KwikPen 40 Units, Subcutaneous, Nightly    Blood Glucose Monitoring Suppl (ONETOUCH VERIO FLEX SYSTEM) w/Device Does not apply Kit 1 kit, Does not apply, 4 times daily, May substitute per insurance formulary    Ferrous Sulfate 325 mg, Oral, Every other day    Glucose Blood (ONETOUCH VERIO) In Vitro Strip Use four times daily in the morning and two hours after each meal    Insulin Pen Needle (COMFORT TOUCH INSULIN PEN NEED) 31G X 4 MM Does not apply Misc 1 Needle, Does not apply, Daily, Pt taking insulin 1 time per day    Lancets (ONETOUCH DELICA PLUS [id number]) Does not apply Misc 4 each, Does not apply, 4 times daily, Test morning and 2 hours after each meal.    PRENATAL 27-0.8 MG Oral Tab 1 tablet, Oral, Daily    triamcinolone acetonide 40 MG/ML Injection Suspension Inject 1 mL by tendon sheath injection route for 1 dose. Objective      Physical Exam:  Vitals:    LMP 2022 (Exact Date)        Review of Systems:  Review of Systems   Constitutional: Negative. Respiratory: Negative. Gastrointestinal: Negative. Genitourinary: Negative. Neurological: Negative. Hematological: Negative. Physical Exam   Constitutional: She is oriented to person, place, and time. She appears well-developed and well-nourished. Cardiovascular: Normal rate and regular rhythm. Pulmonary/Chest: Effort normal and breath sounds normal.   Abdominal: Soft. Genitourinary:   Genitourinary Comments: Gravid uterus   FHT Category 1   [place]  rare uc   Musculoskeletal: Normal range of motion. Neurological: She is alert and oriented to person, place, and time. Skin: Skin is warm and dry. Psychiatric: She has a normal mood and affect. CBC:    Lab Results   Component Value Date    WBC 11.3 (H) 2023    WBC 11.8 (H) 2023    WBC 11.7 (H) 2023     Lab Results   Component Value Date    HGB 11.0 (L) 2023    HGB 11.0 (L) 2023    HGB 11.4 (L) 2023      Lab Results   Component Value Date    .0 2023    .0 2023    .0 2023           ASSESSMENT AND PLAN:      Active Problems:    AMA (advanced maternal age) multigravida 35+, unspecified trimester    Insulin controlled gestational diabetes mellitus (GDM) during pregnancy, antepartum    Obesity affecting pregnancy, antepartum    Previous  delivery, antepartum condition or complication    Consultation for female sterilization    Pregnancy        Preop abx weight based per protocol   Consent signed and on chart  Prenatal record reviewed  DAVIAN NORWOOD   The patient was counseled regarding surgery.    Risks of c/s including bleeding/need for blood transfusion (<1%), infection (5-10%), damage to other organs/bowel/bladder/ureters (<1%). Benefits, alternatives, & indications were also discussed. All questions were answered. Sherice CRAWFORD.        Megan Mendoza MD

## 2023-08-05 NOTE — OPERATIVE REPORT
[facility]     Section Delivery Note    Christiana Comfort Patient Status:  Inpatient    9/15/1985 MRN [id number]   Location [address] Attending Rahul Villafana MD   Hosp Day # 0 PCP No primary care provider on file. Pre Op Diagnosis: history of prior  section and multiparity   Post Op Diagnosis: same as pre-op  Procedure:   Surgical/Procedural Cases on this Admission       Case IDs Date Procedure Surgeon Location Status    [id number] 23  SECTION Rahul Villafana  [address] L+D OR [place]    [id number] 23  SECTION WITH BILATERAL SALPINGECTOMY Rahul Villafana  [address] L+D OR [place]          LTCS and Bilateral Salpingectomy  Surgeon:  Jimmie Kussmaul, MD  Assistant Surgeon:  Sherif Rivera DO    Anesthesia: spinal   IVF: See Nursing documentation   EBL: See Nursing documentation   UO: See Nursing documentation   Complications: none  Neonatologist Present: yes  Findings: normal uterus, normal tubes and normal ovaries  Specimen: placenta   Infant  Date of Delivery: 2023   Time of Delivery: 10:05 AM  Infant Sex: Information for the patient's : Margaret Pod Girl [[id number]]   female  Infant Birthweight: Information for the patient's : Calvin Monteiro, Girl [[id number]]   7 lb 0.2 oz (3.18 kg)   Apgars:  1 minute: 8               5 minutes: 9                        10 minutes:        After obtaining consent, the patient was taken to the operative suite where she was administered spinal anesthesia. Fetal heart tones were obtained. Mendoza catheter was anchored and the patient was prepped and draped in a sterile fashion. Time-out was performed. Anesthesia was checked and found to be adequate. A Pfannenstiel skin incision was made and carried down sharply to the rectus fascia. The rectus fascia was incised in midline and extended bluntly. Peritoneum was identified and entered bluntly. A Mobius retractor was then placed.       In the lower uterine segment, a low transverse incision was made with a scalpel and extended bluntly. Amniotomy was performed and clear fluid was noted. The infant was delivered in the vertex presentation without difficulty. Infant was vigorous and crying on the surgical field. Delayed cord clamping x 30 seconds was performed. Cord doubly clamped and cut. Infant was taken to the  team in attendance. Placenta was then extracted from the uterus manually after collecting cord blood. Moist lap sponges were used to remove any remaining clots from the uterus. The hysterotomy was then grasped with ring forceps and reapproximated using a #1 Chromic suture in a running locked fashion. Figure of 8 sutures of  #1 Chromic suture was used PRN to achieve excellent hemostasis. Lateral gutters were wiped clear free of blood clot. Tubes and ovaries were inspected and normal.  Hysterotomy was inspected for a final time and again hemostatic. Interceed was then placed over the hysterotomy. Subfascial tissue was inspected and hemostatic. The fascia was then reapproximated using a 0 looped PDS suture in a running fashion. Subcutaneous tissue was irrigated, wiped clean of blood and clot and bovie used PRN to achieve hemostasis. The subcutaneous tissue was closed using a 0 plain suture in a running fashion. The skin was then closed using a subcuticular stitch with a 4-0 Vicryl. Skin glue was placed over the incision. Patient tolerated the procedure well. She was transferred to recovery room in stable condition. There were no immediate complications. Dr. Denise Seals was present for the entire procedure and assisted with dissection in the abdominal cavity, delivery of the baby and the repair of the abdominal cavity. (3) adequate

## 2023-08-06 LAB
BASOPHILS # BLD AUTO: 0.06 X10(3) UL (ref 0–0.2)
BASOPHILS NFR BLD AUTO: 0.6 %
DEPRECATED RDW RBC AUTO: 44.6 FL (ref 35.1–46.3)
EOSINOPHIL # BLD AUTO: 0.23 X10(3) UL (ref 0–0.7)
EOSINOPHIL NFR BLD AUTO: 2.2 %
ERYTHROCYTE [DISTWIDTH] IN BLOOD BY AUTOMATED COUNT: 14.7 % (ref 11–15)
HCT VFR BLD AUTO: 27 %
HGB BLD-MCNC: 8.9 G/DL
IMM GRANULOCYTES # BLD AUTO: 0.09 X10(3) UL (ref 0–1)
IMM GRANULOCYTES NFR BLD: 0.9 %
LYMPHOCYTES # BLD AUTO: 2.24 X10(3) UL (ref 1–4)
LYMPHOCYTES NFR BLD AUTO: 21.5 %
MCH RBC QN AUTO: 28.4 PG (ref 26–34)
MCHC RBC AUTO-ENTMCNC: 33 G/DL (ref 31–37)
MCV RBC AUTO: 86.3 FL
MONOCYTES # BLD AUTO: 0.55 X10(3) UL (ref 0.1–1)
MONOCYTES NFR BLD AUTO: 5.3 %
NEUTROPHILS # BLD AUTO: 7.24 X10 (3) UL (ref 1.5–7.7)
NEUTROPHILS # BLD AUTO: 7.24 X10(3) UL (ref 1.5–7.7)
NEUTROPHILS NFR BLD AUTO: 69.5 %
PLATELET # BLD AUTO: 235 10(3)UL (ref 150–450)
RBC # BLD AUTO: 3.13 X10(6)UL
WBC # BLD AUTO: 10.4 X10(3) UL (ref 4–11)

## 2023-08-06 RX ORDER — DIPHENHYDRAMINE HCL 25 MG
25 CAPSULE ORAL EVERY 6 HOURS PRN
Status: DISCONTINUED | OUTPATIENT
Start: 2023-08-06 | End: 2023-08-08

## 2023-08-06 NOTE — LACTATION NOTE
LACTATION NOTE - MOTHER      Evaluation Type: Inpatient    Problems identified  Problems identified: Knowledge deficit    Maternal history  Maternal history: Caesarean section;AMA; Gestational diabetes;Obesity    Breastfeeding goal  Breastfeeding goal: To maintain breast milk feeding per patient goal    Maternal Assessment  Prior breastfeeding experience (comment below): Multip; Successful  Prior BF experience: comment: 6 months  Breastfeeding Assistance: Breastfeeding assistance provided with permission         Guidelines for use of: Other (comment): Mom reports BF going well, denies pain/discomfort or need for additional support at this time. Provided anticipatory guidance on expected breastfeeding behavior over next few days and lactation physiology.

## 2023-08-08 VITALS
RESPIRATION RATE: 16 BRPM | OXYGEN SATURATION: 96 % | HEART RATE: 88 BPM | TEMPERATURE: 99 F | SYSTOLIC BLOOD PRESSURE: 132 MMHG | DIASTOLIC BLOOD PRESSURE: 89 MMHG

## 2023-08-08 PROBLEM — Z34.90 PREGNANCY: Status: RESOLVED | Noted: 2023-08-05 | Resolved: 2023-08-08

## 2023-08-08 PROBLEM — O09.529 AMA (ADVANCED MATERNAL AGE) MULTIGRAVIDA 35+, UNSPECIFIED TRIMESTER: Status: RESOLVED | Noted: 2023-04-11 | Resolved: 2023-08-08

## 2023-08-08 PROBLEM — Z64.1 MULTIPARITY: Status: RESOLVED | Noted: 2023-08-05 | Resolved: 2023-08-08

## 2023-08-08 PROBLEM — O09.529 AMA (ADVANCED MATERNAL AGE) MULTIGRAVIDA 35+, UNSPECIFIED TRIMESTER (HCC): Status: RESOLVED | Noted: 2023-04-11 | Resolved: 2023-08-08

## 2023-08-08 PROBLEM — Z34.90 PREGNANCY (HCC): Status: RESOLVED | Noted: 2023-08-05 | Resolved: 2023-08-08

## 2023-08-08 RX ORDER — IBUPROFEN 600 MG/1
600 TABLET ORAL EVERY 6 HOURS
Qty: 16 TABLET | Refills: 0 | Status: SHIPPED | OUTPATIENT
Start: 2023-08-08

## 2023-08-08 RX ORDER — PSEUDOEPHEDRINE HCL 30 MG
100 TABLET ORAL
Qty: 30 CAPSULE | Refills: 0 | Status: SHIPPED | OUTPATIENT
Start: 2023-08-08

## 2023-08-08 RX ORDER — MELATONIN
325
Qty: 42 TABLET | Refills: 0 | Status: SHIPPED | OUTPATIENT
Start: 2023-08-08

## 2023-08-08 RX ORDER — HYDROCODONE BITARTRATE AND ACETAMINOPHEN 5; 325 MG/1; MG/1
1-2 TABLET ORAL EVERY 6 HOURS PRN
Qty: 12 TABLET | Refills: 0 | Status: SHIPPED | OUTPATIENT
Start: 2023-08-08

## 2023-08-08 NOTE — DISCHARGE SUMMARY
[facility]    Discharge Summary    Dayanara Wilkins Patient Status:  Inpatient    9/15/1985 MRN [id number]   Location [facility] 3SE Attending Katya Arriaga MD   Hosp Day # 3 PCP No primary care provider on file. Date of Admission: 2023    Date of Discharge: 23  Discharge Time:     Admission Diagnoses: filomena ////GD A2//repeat  Pregnancy    Secondary Diagnosis:     Primary OB Clinician: Dr. Sariah Daniel Course:     [facility]: Estimated Date of Delivery: 23    Gestational Age: 38w1d    Date of Delivery: 23  Time of Delivery:     Antepartum complications: none    Delivered By: Dr. Tevin Cristina    Delivery Type: rpt LTCS w/ bilateral salpingectomy    Tubal Ligation: done with c/section    Baby: Liveborn ,     Apgars:  1 minute:                    5 minutes:                           10 minutes:      Anesthesia: spinal      Surgical Procedures       Case IDs Date Procedure Surgeon Location Status    [id number] 23  SECTION Katya Arriaga MD [address] [place] [address] L+D OR Can    [id number] 23  SECTION WITH BILATERAL SALPINGECTOMY Katya Arriaga MD [address] [place] [address] L+D OR Comp            Intrapartum Complications: None    Laceration:     Episiotomy:     Placenta:     Feeding Method:     Rh Immune Globulin Given:     Rubella Vaccine Given:         Discharge Plan:   Discharge Condition: Stable  Early Discharge:  NO    Discharge medications:  Current Discharge Medication List    New Orders    docusate sodium 100 MG Oral Cap  Take 100 mg by mouth daily as needed for constipation. ibuprofen 600 MG Oral Tab  Take 1 tablet (600 mg total) by mouth every 6 (six) hours. ferrous sulfate 325 (65 FE) MG Oral Tab EC  Take 1 tablet (325 mg total) by mouth daily with breakfast.    HYDROcodone-acetaminophen 5-325 MG Oral Tab  Take 1-2 tablets by mouth every 6 (six) hours as needed for Pain.       Home Meds - Unchanged    Ferrous Sulfate 325 (65 Fe) MG Oral Tab  Take 1 tablet (325 mg total) by mouth every other day. PRENATAL 27-0.8 MG Oral Tab  Take 1 tablet by mouth daily. Insulin Pen Needle (COMFORT TOUCH INSULIN PEN NEED) 31G X 4 MM Does not apply Misc  1 Needle daily. Pt taking insulin 1 time per day    Lancets (Clerance Dancer) Does not apply Misc  4 each 4 (four) times daily. Test morning and 2 hours after each meal.    Blood Glucose Monitoring Suppl ([address]) w/Device Does not apply Kit  1 kit 4 (four) times daily. May substitute per insurance formulary                Discharge Diet: As tolerated    Discharge Activity: As tolerated    Follow up: Follow-up [facility]. Call. Specialty: Pediatrics  Why: As needed  Contact information:  [address] [id number] [phone]             Alex Rodriguez MD Follow up in 2 week(s).     Specialty: OBSTETRICS & GYNECOLOGY  Contact information:  [address] [facility]  [phone]                             Follow up Labs:  in             Kalyan Thapa MD  8/8/2023

## 2023-08-10 ENCOUNTER — OFFICE VISIT (OUTPATIENT)
Dept: FAMILY MEDICINE CLINIC | Facility: CLINIC | Age: 38
End: 2023-08-10
Payer: MEDICAID

## 2023-08-10 ENCOUNTER — HOSPITAL ENCOUNTER (OUTPATIENT)
Age: 38
Discharge: HOME | End: 2023-08-10
Payer: MEDICAID

## 2023-08-10 ENCOUNTER — APPOINTMENT (OUTPATIENT)
Dept: ULTRASOUND IMAGING | Age: 38
End: 2023-08-10
Attending: NURSE PRACTITIONER
Payer: MEDICAID

## 2023-08-10 VITALS
RESPIRATION RATE: 16 BRPM | HEART RATE: 91 BPM | OXYGEN SATURATION: 97 % | TEMPERATURE: 98 F | SYSTOLIC BLOOD PRESSURE: 108 MMHG | DIASTOLIC BLOOD PRESSURE: 62 MMHG

## 2023-08-10 VITALS
TEMPERATURE: 98 F | HEART RATE: 90 BPM | DIASTOLIC BLOOD PRESSURE: 69 MMHG | SYSTOLIC BLOOD PRESSURE: 126 MMHG | OXYGEN SATURATION: 99 % | RESPIRATION RATE: 18 BRPM

## 2023-08-10 DIAGNOSIS — R60.0 EDEMA OF RIGHT UPPER EXTREMITY: Primary | ICD-10-CM

## 2023-08-10 DIAGNOSIS — I82.611 SUPERFICIAL VENOUS THROMBOSIS OF RIGHT UPPER EXTREMITY: Primary | ICD-10-CM

## 2023-08-10 DIAGNOSIS — L03.90 CELLULITIS, UNSPECIFIED CELLULITIS SITE: ICD-10-CM

## 2023-08-10 PROCEDURE — 99213 OFFICE O/P EST LOW 20 MIN: CPT | Performed by: NURSE PRACTITIONER

## 2023-08-10 PROCEDURE — 99214 OFFICE O/P EST MOD 30 MIN: CPT

## 2023-08-10 PROCEDURE — 99203 OFFICE O/P NEW LOW 30 MIN: CPT

## 2023-08-10 PROCEDURE — 3074F SYST BP LT 130 MM HG: CPT | Performed by: NURSE PRACTITIONER

## 2023-08-10 PROCEDURE — 3078F DIAST BP <80 MM HG: CPT | Performed by: NURSE PRACTITIONER

## 2023-08-10 PROCEDURE — 93971 EXTREMITY STUDY: CPT | Performed by: NURSE PRACTITIONER

## 2023-08-10 RX ORDER — CLINDAMYCIN HYDROCHLORIDE 300 MG/1
300 CAPSULE ORAL 3 TIMES DAILY
Qty: 30 CAPSULE | Refills: 0 | Status: SHIPPED | OUTPATIENT
Start: 2023-08-10 | End: 2023-08-20

## 2023-08-10 NOTE — ED QUICK NOTES
Follow-up in the office in 1 week if you go home with a catheter if not, 2 weeks. If you go home with a catheter, call the office to have it removed in 1 week. Do not urinate for 2 to 3 hours prior to coming to the office so they can check if you are urinating well after removing the catheter.  Call the office for an appointment.  Call the office if you are experiencing heavy vaginal bleeding, fevers, chills, pain that is not controlled with your pain medication.   If you are unable to void for greater than 5 hours and you have gone home without a catheter, call the office or go to the nearest ER to have a catheter placed if it is after hours.  Maintain pelvic rest for the next 6 weeks.  Do not lift, pull or push anything more than 10 to 15 pounds for the next 6 to 8 weeks.   Ultrasound completed, results pending. No change in status.

## 2023-08-10 NOTE — PROGRESS NOTES
Subjective:   Patient ID: Aleksandr Morton is a 30year old female. Tamimook Navarro [id number] [facility]      Pt delivered via  6 days ago. 2 days ago her IV site in the right arm started to swell. Today it is painful, swollen, tight, and hot to the touch. Pt denies fever, chills, and flu-like symptoms. Denies other issues with her  recovery. Denies drainage from the site. Reports slightly reduce ROM due to the swelling tightness. Describes pain at 5/10, has been taking tylenol and motrin for postpartum/ pain which helps with her surgical sight but not with the pain from the right arm IV site. Denies numbness, tingling, or loss of sensation in the right upper extremity. History/Other:   Review of Systems   Constitutional:  Negative for chills and fever. Skin:         Right arm pain, swelling, redness, and palpable warmth at the site   All other systems reviewed and are negative. Current Outpatient Medications   Medication Sig Dispense Refill    docusate sodium 100 MG Oral Cap Take 100 mg by mouth daily as needed for constipation. 30 capsule 0    ibuprofen 600 MG Oral Tab Take 1 tablet (600 mg total) by mouth every 6 (six) hours. 16 tablet 0    ferrous sulfate 325 (65 FE) MG Oral Tab EC Take 1 tablet (325 mg total) by mouth daily with breakfast. 42 tablet 0    HYDROcodone-acetaminophen 5-325 MG Oral Tab Take 1-2 tablets by mouth every 6 (six) hours as needed for Pain. 12 tablet 0    Insulin Pen Needle (COMFORT TOUCH INSULIN PEN NEED) 31G X 4 MM Does not apply Misc 1 Needle daily. Pt taking insulin 1 time per day 100 each 1    Ferrous Sulfate 325 (65 Fe) MG Oral Tab Take 1 tablet (325 mg total) by mouth every other day. 90 tablet 3    Blood Glucose Monitoring Suppl ([address]) w/Device Does not apply Kit 1 kit 4 (four) times daily. May substitute per insurance formulary 1 kit 0    PRENATAL 27-0.8 MG Oral Tab Take 1 tablet by mouth daily.        Allergies:No Known Allergies    Objective:   Physical Exam  Vitals and nursing note reviewed. Constitutional:       Appearance: Normal appearance. She is not ill-appearing. Cardiovascular:      Rate and Rhythm: Normal rate. Pulses:           Radial pulses are 2+ on the right side and 2+ on the left side. Pulmonary:      Effort: Pulmonary effort is normal.      Breath sounds: No wheezing. Musculoskeletal:      Right forearm: No swelling, edema or tenderness. Left forearm: Normal.   Skin:     General: Skin is warm and dry. Capillary Refill: Capillary refill takes less than 2 seconds. Neurological:      Mental Status: She is alert and oriented to person, place, and time. Psychiatric:         Mood and Affect: Mood normal.         Behavior: Behavior normal.         Assessment & Plan:   Edema of right upper extremity  (primary encounter diagnosis)    Discussed the limitations of the walk in clinic and the need for higher level of care for phlebitis/thrombosis. Reviewed report with [address] [place] [address] provider who accepted pt for further work up. Reviewed instructions and directions. Pt is here with brother in law [facility]. Pt stable and agrees with plan. Questions answered.        Meds This Visit:  Requested Prescriptions      No prescriptions requested or ordered in this encounter       Imaging & Referrals:  None

## 2023-08-10 NOTE — ED INITIAL ASSESSMENT (HPI)
Post partum 5 days. Presents with redness, swelling, and warm to touch area of right forearm where IV was placed. No fever.

## 2023-08-10 NOTE — DISCHARGE INSTRUCTIONS
Warm compresses to the area. Take the antibiotics as prescribed. Ibuprofen as needed for pain. Follow-up with your doctor to have the wound checked in the next couple days. If you develop any fevers or any other worsening or concerning symptoms, go to the nearest emergency department for further evaluation.

## 2023-08-11 ENCOUNTER — NST DOCUMENTATION (OUTPATIENT)
Dept: OBGYN CLINIC | Facility: CLINIC | Age: 38
End: 2023-08-11

## 2023-08-11 DIAGNOSIS — O09.523 MULTIGRAVIDA OF ADVANCED MATERNAL AGE IN THIRD TRIMESTER: ICD-10-CM

## 2023-08-11 DIAGNOSIS — O24.414 INSULIN CONTROLLED GESTATIONAL DIABETES MELLITUS (GDM) DURING PREGNANCY, ANTEPARTUM: Primary | ICD-10-CM

## 2023-08-11 PROCEDURE — 59025 FETAL NON-STRESS TEST: CPT | Performed by: OBSTETRICS & GYNECOLOGY

## 2023-08-11 NOTE — NST
Nonstress Test   Patient: Rosanna Dacosta    Gestation: 38w1d    Diagnosis from order:   ABRANA        NST:        2023   NST DOCUMENTATION   Variability 6-25 BPM   Accelerations Yes   Decelerations None   Baseline 135 BPM   Uterine Irritability No   Contractions Not present   Acoustic Stimulator No   Nonstress Test Interpretation Reactive   Comments Tracing reviewed by Dr Apoorva Chamorro. Discharge order received. Pt is a scheduled  section tomorrow at 09:30. NST Completed by Marshall Peres RN   Disposition Home    Testing Plan Weekly NST   Provider Notified Apoorva Chamorro MD         I agree with the above evaluation. NST completed.   Juliano Green MD  2023  5:19 PM

## 2023-08-11 NOTE — NST
Nonstress Test   Patient: Zarina Schafer    Gestation: 38w1d    Diagnosis from order:  A2GDM        NST:        2023   NST DOCUMENTATION   Variability 6-25 BPM   Accelerations Yes   Decelerations None   Baseline 135 BPM   Uterine Irritability No   Contractions Not present   Acoustic Stimulator No   Nonstress Test Interpretation Reactive   Comments Tracing reviewed by Dr Mary Jo Salinas. Discharge order received. Pt is a scheduled  section tomorrow at 09:30. NST Completed by Benedict Naylor RN   Disposition Home    Testing Plan Weekly NST   Provider Notified Mary Jo Salinas MD         I agree with the above evaluation. NST completed.   Wilman Mckee MD  2023  5:34 PM

## 2023-08-30 ENCOUNTER — TELEPHONE (OUTPATIENT)
Dept: OBGYN UNIT | Facility: HOSPITAL | Age: 38
End: 2023-08-30

## 2023-08-31 ENCOUNTER — POSTPARTUM (OUTPATIENT)
Dept: OBGYN CLINIC | Facility: CLINIC | Age: 38
End: 2023-08-31

## 2023-08-31 VITALS
HEIGHT: 62 IN | WEIGHT: 181.69 LBS | DIASTOLIC BLOOD PRESSURE: 74 MMHG | SYSTOLIC BLOOD PRESSURE: 110 MMHG | BODY MASS INDEX: 33.43 KG/M2

## 2023-08-31 DIAGNOSIS — O24.414 INSULIN CONTROLLED GESTATIONAL DIABETES MELLITUS (GDM) DURING PREGNANCY, ANTEPARTUM: ICD-10-CM

## 2023-08-31 PROCEDURE — 3074F SYST BP LT 130 MM HG: CPT | Performed by: OBSTETRICS & GYNECOLOGY

## 2023-08-31 PROCEDURE — 3008F BODY MASS INDEX DOCD: CPT | Performed by: OBSTETRICS & GYNECOLOGY

## 2023-08-31 PROCEDURE — 3078F DIAST BP <80 MM HG: CPT | Performed by: OBSTETRICS & GYNECOLOGY

## 2024-09-28 ENCOUNTER — OFFICE VISIT (OUTPATIENT)
Dept: FAMILY MEDICINE CLINIC | Facility: CLINIC | Age: 39
End: 2024-09-28
Payer: MEDICAID

## 2024-09-28 VITALS
HEIGHT: 62 IN | HEART RATE: 67 BPM | DIASTOLIC BLOOD PRESSURE: 78 MMHG | OXYGEN SATURATION: 97 % | RESPIRATION RATE: 18 BRPM | SYSTOLIC BLOOD PRESSURE: 110 MMHG | BODY MASS INDEX: 35.33 KG/M2 | WEIGHT: 192 LBS | TEMPERATURE: 97 F

## 2024-09-28 DIAGNOSIS — M54.2 NONSPECIFIC PAIN IN THE NECK REGION: Primary | ICD-10-CM

## 2024-09-28 LAB
CONTROL LINE PRESENT WITH A CLEAR BACKGROUND (YES/NO): YES YES/NO
KIT LOT #: NORMAL NUMERIC
STREP GRP A CUL-SCR: NEGATIVE

## 2024-09-28 PROCEDURE — 87880 STREP A ASSAY W/OPTIC: CPT | Performed by: NURSE PRACTITIONER

## 2024-09-28 PROCEDURE — 99213 OFFICE O/P EST LOW 20 MIN: CPT | Performed by: NURSE PRACTITIONER

## 2024-09-28 PROCEDURE — 87081 CULTURE SCREEN ONLY: CPT | Performed by: NURSE PRACTITIONER

## 2024-09-28 NOTE — PROGRESS NOTES
CHIEF COMPLAINT:     Chief Complaint   Patient presents with    Sore Throat     Week w/ pain ((right) jose eof neck and headache.        HPI:   Scarlett Martinez is a 39 year old female presents to clinic with complaint of pain in right neck/anterior cervical gland area.  Onset 1 week ago.  Sometimes the pain will radiate into a generalized headache.  The pain is about 7/10. Denies pain with swallowing.  Denies congestion or cough.  No known injury to the neck.  Neck maintains full ROM without difficulty.  Denies similar pain the past.  Son has been sick at home with cold symptoms, no known strep exposure.  Taking motrin 200 mg every 8 hours with temporary relief.  Denies thyroid or chronic neck pain hx.  Not taking any chronic medications.  Denies severe/\"worst headache of life\".    See  template.    Current Outpatient Medications   Medication Sig Dispense Refill    docusate sodium 100 MG Oral Cap Take 100 mg by mouth daily as needed for constipation. 30 capsule 0    ibuprofen 600 MG Oral Tab Take 1 tablet (600 mg total) by mouth every 6 (six) hours. 16 tablet 0    ferrous sulfate 325 (65 FE) MG Oral Tab EC Take 1 tablet (325 mg total) by mouth daily with breakfast. 42 tablet 0    HYDROcodone-acetaminophen 5-325 MG Oral Tab Take 1-2 tablets by mouth every 6 (six) hours as needed for Pain. 12 tablet 0    Insulin Pen Needle (COMFORT TOUCH INSULIN PEN NEED) 31G X 4 MM Does not apply Misc 1 Needle daily. Pt taking insulin 1 time per day 100 each 1    Ferrous Sulfate 325 (65 Fe) MG Oral Tab Take 1 tablet (325 mg total) by mouth every other day. 90 tablet 3    Blood Glucose Monitoring Suppl (ONETOUCH VERIO FLEX SYSTEM) w/Device Does not apply Kit 1 kit 4 (four) times daily. May substitute per insurance formulary 1 kit 0    PRENATAL 27-0.8 MG Oral Tab Take 1 tablet by mouth daily.        Past Medical History:    Anemia    Gestational diabetes (HCC)      Social History:  Social History     Socioeconomic History     Marital status:    Tobacco Use    Smoking status: Never     Passive exposure: Never    Smokeless tobacco: Never   Vaping Use    Vaping status: Never Used   Substance and Sexual Activity    Alcohol use: Yes     Comment: occasional    Drug use: Never    Sexual activity: Yes     Partners: Male     Social Determinants of Health     Financial Resource Strain: Low Risk  (2/21/2023)    Financial Resource Strain     Difficulty of Paying Living Expenses: Not hard at all     Med Affordability: No   Food Insecurity: Not on File (9/26/2024)    Received from [employer]    Food Insecurity     Food: 0   Transportation Needs: Unknown (8/5/2023)    Transportation Needs     Car Seat: Yes   Physical Activity: Not on File (10/7/2022)    Received from [employer]    Physical Activity     Physical Activity: 0   Stress: No Stress Concern Present (2/21/2023)    Stress     Feeling of Stress : No   Social Connections: Not on File (9/13/2024)    Received from [employer]    Social Connections     Connectedness: 0   Housing Stability: Low Risk  (2/21/2023)    Housing Stability     Housing Instability: No        REVIEW OF SYSTEMS:   GENERAL HEALTH: feels well otherwise, normal appetite  SKIN: denies any unusual skin lesions or rashes  HEENT: denies ear pain or difficulty swallowing/eating. See HPI  RESPIRATORY: denies shortness of breath or wheezing  CARDIOVASCULAR: denies chest pain or palpitations   GI: denies vomiting or diarrhea  NEURO: denies dizziness or lightheadedness    EXAM:   /78   Pulse 67   Temp 97.2 °F (36.2 °C)   Resp 18   Ht 5' 2\" (1.575 m)   Wt 192 lb (87.1 kg)   LMP 09/16/2024   SpO2 97%   Breastfeeding No   BMI 35.12 kg/m²   GENERAL: Well-appearing, well developed, well nourished, in no apparent distress  SKIN: no rashes, no suspicious lesions  HEAD: atraumatic, normocephalic  EYES: conjunctiva clear, EOM intact  EARS: TM's clear, non-injected, no bulging, retraction, or fluid bilaterally  NOSE: nostrils patent,  no exudates, nasal mucosa pink and noninflamed  THROAT: oral mucosa pink, moist. Posterior pharynx erythematous and injected. No exudates.   NECK: supple, non-tender with ROM.  No carotid bruit.  No thyromegaly or palpable nodules.  LUNGS: clear to auscultation bilaterally, no wheezes or rhonchi. Breathing is non labored.  CARDIO: RRR without murmur  GI: + BS's, no masses, hepatosplenomegaly, or tenderness on direct palpation  EXTREMITIES: no cyanosis, clubbing or edema  LYMPH: +Localized tenderness with palpation to anterior cervical area.  No palpable lymphadenopathy.  No overlying redness or swelling.  NEURO: A&O x4.  Cranial nerves grossly intact.  Gait- normal.  DTRs 2+/5.  Moving all extremities without difficulty.  Normal speech, no facial droop.    Recent Results (from the past 24 hour(s))   Strep A Assay W/Optic    Collection Time: 09/28/24  4:10 PM   Result Value Ref Range    Strep Grp A Screen negative Negative    Control Line Present with a clear background (yes/no) yes Yes/No    Kit Lot # [id number] Numeric    Kit Expiration Date 05/21/2025 Date         ASSESSMENT AND PLAN:   Assessment:   Scarlett was seen today for sore throat.    Diagnoses and all orders for this visit:    Nonspecific pain in the neck region  -     Strep A Assay W/Optic  -     Grp A Strep Cult, Throat; Future  -     Grp A Strep Cult, Throat          Plan:   - Very localized area of pain in right anterior cervical area.  Exam benign and symptoms 1 week.  - Son has had URI symptoms at home.  Rapid strep neg, culture sent.  - Suspect muscular (vs viral) cause.  - Advised OTC motrin 2-3 capsules Q6-8 hours PRN.  - Gentle stretches, moist heat.  - Advised follow up visit with PCP if no improvement or worsening within 2-3 days.  - Advised to strictly proceed to ER if ever severe headache/worst headache of life, stiff neck, visual changes, facial/extremity weakness.  - Pt verbalizes understanding and is agreeable w/ plan.    There are no Patient  Instructions on file for this visit.

## 2024-12-26 ENCOUNTER — OFFICE VISIT (OUTPATIENT)
Dept: FAMILY MEDICINE CLINIC | Facility: CLINIC | Age: 39
End: 2024-12-26
Payer: MEDICAID

## 2024-12-26 ENCOUNTER — NURSE ONLY (OUTPATIENT)
Dept: LAB | Age: 39
End: 2024-12-26
Attending: NURSE PRACTITIONER
Payer: MEDICAID

## 2024-12-26 VITALS
OXYGEN SATURATION: 100 % | HEIGHT: 62.6 IN | RESPIRATION RATE: 18 BRPM | DIASTOLIC BLOOD PRESSURE: 76 MMHG | WEIGHT: 180 LBS | SYSTOLIC BLOOD PRESSURE: 116 MMHG | TEMPERATURE: 101 F | HEART RATE: 106 BPM | BODY MASS INDEX: 32.3 KG/M2

## 2024-12-26 DIAGNOSIS — B34.9 VIRAL SYNDROME: ICD-10-CM

## 2024-12-26 DIAGNOSIS — J10.1 INFLUENZA A: Primary | ICD-10-CM

## 2024-12-26 LAB
POCT INFLUENZA A: POSITIVE
POCT INFLUENZA B: NEGATIVE

## 2024-12-26 PROCEDURE — 99213 OFFICE O/P EST LOW 20 MIN: CPT | Performed by: NURSE PRACTITIONER

## 2024-12-26 PROCEDURE — 87502 INFLUENZA DNA AMP PROBE: CPT

## 2024-12-26 RX ORDER — OSELTAMIVIR PHOSPHATE 75 MG/1
75 CAPSULE ORAL 2 TIMES DAILY
Qty: 10 CAPSULE | Refills: 0 | Status: SHIPPED | OUTPATIENT
Start: 2024-12-26 | End: 2024-12-31

## 2024-12-26 NOTE — PROGRESS NOTES
CHIEF COMPLAINT:     Chief Complaint   Patient presents with    Cold     Sore throat, headache, and body chills. Symptoms started yesterday   OTC: tylenol   Daughter tested positive for flu A        HPI:   Scarlett Martinez is a 39 year old female who presents for upper respiratory symptoms for  1 days. Patient reports sore throat, head ache, body aches, and chills. Symptoms have been worsening since onset.  Treating symptoms with Tylenol which helps minimally. Denies trouble breathing. Exposed to daughter who tested positive for flu A.     Ronn [id number] .    Current Outpatient Medications   Medication Sig Dispense Refill    oseltamivir 75 MG Oral Cap Take 1 capsule (75 mg total) by mouth 2 (two) times daily for 5 days. 10 capsule 0    docusate sodium 100 MG Oral Cap Take 100 mg by mouth daily as needed for constipation. 30 capsule 0    ibuprofen 600 MG Oral Tab Take 1 tablet (600 mg total) by mouth every 6 (six) hours. 16 tablet 0    ferrous sulfate 325 (65 FE) MG Oral Tab EC Take 1 tablet (325 mg total) by mouth daily with breakfast. 42 tablet 0    HYDROcodone-acetaminophen 5-325 MG Oral Tab Take 1-2 tablets by mouth every 6 (six) hours as needed for Pain. 12 tablet 0    Insulin Pen Needle (COMFORT TOUCH INSULIN PEN NEED) 31G X 4 MM Does not apply Misc 1 Needle daily. Pt taking insulin 1 time per day 100 each 1    Ferrous Sulfate 325 (65 Fe) MG Oral Tab Take 1 tablet (325 mg total) by mouth every other day. 90 tablet 3    Blood Glucose Monitoring Suppl (ONETOUCH VERIO FLEX SYSTEM) w/Device Does not apply Kit 1 kit 4 (four) times daily. May substitute per insurance formulary 1 kit 0    PRENATAL 27-0.8 MG Oral Tab Take 1 tablet by mouth daily.        Past Medical History:    Anemia    Gestational diabetes (HCC)      Past Surgical History:   Procedure Laterality Date               Social History     Socioeconomic History    Marital status:    Tobacco Use    Smoking status: Never      Passive exposure: Never    Smokeless tobacco: Never   Vaping Use    Vaping status: Never Used   Substance and Sexual Activity    Alcohol use: Yes     Comment: occasional    Drug use: Never    Sexual activity: Yes     Partners: Male     Social Drivers of Health     Financial Resource Strain: Low Risk  (2/21/2023)    Financial Resource Strain     Difficulty of Paying Living Expenses: Not hard at all     Med Affordability: No   Food Insecurity: Not on File (9/26/2024)    Received from [employer]    Food Insecurity     Food: 0   Transportation Needs: Unknown (8/5/2023)    Transportation Needs     Car Seat: Yes   Physical Activity: Not on File (10/7/2022)    [facility] from [employer]    Physical Activity     Physical Activity: 0   Stress: No Stress Concern Present (2/21/2023)    Stress     Feeling of Stress : No   Social Connections: Not on File (9/13/2024)    Received from [employer]    Social Connections     Connectedness: 0   Housing Stability: Low Risk  (2/21/2023)    Housing Stability     Housing Instability: No         REVIEW OF SYSTEMS:   Review of Systems   Constitutional:  Positive for chills and fever.   HENT:  Positive for sore throat.    Gastrointestinal:  Positive for nausea.   Musculoskeletal:  Positive for myalgias.   Neurological:  Positive for headaches.   All other systems reviewed and are negative.         EXAM:   /76   Pulse 106   Temp (!) 101.2 °F (38.4 °C) (Temporal)   Resp 18   Ht 5' 2.6\" (1.59 m)   Wt 180 lb (81.6 kg)   LMP 12/11/2024 (Approximate)   SpO2 100%   BMI 32.30 kg/m²   Physical Exam  Vitals and nursing note reviewed.   Constitutional:       Appearance: Normal appearance. She is not ill-appearing.   HENT:      Head: Normocephalic and atraumatic.      Right Ear: Hearing, tympanic membrane, ear canal and external ear normal. No drainage. There is no impacted cerumen. Tympanic membrane is not injected, perforated, erythematous or bulging.      Left Ear: Hearing, tympanic membrane, ear  canal and external ear normal. No drainage. There is no impacted cerumen. Tympanic membrane is not injected, perforated, erythematous or bulging.      Nose: Mucosal edema and rhinorrhea present. No congestion.      Right Sinus: No maxillary sinus tenderness or frontal sinus tenderness.      Left Sinus: No maxillary sinus tenderness or frontal sinus tenderness.      Mouth/Throat:      Lips: No lesions.      Mouth: Mucous membranes are moist. No oral lesions.      Palate: No lesions.      Pharynx: Oropharynx is clear. Uvula midline. Posterior oropharyngeal erythema present. No oropharyngeal exudate.      Tonsils: No tonsillar exudate or tonsillar abscesses. 1+ on the right. 1+ on the left.   Eyes:      General: No scleral icterus.        Right eye: No discharge.         Left eye: No discharge.      Conjunctiva/sclera: Conjunctivae normal.   Cardiovascular:      Rate and Rhythm: Normal rate and regular rhythm.      Heart sounds: Normal heart sounds. No murmur heard.  Pulmonary:      Effort: Pulmonary effort is normal. No tachypnea, accessory muscle usage, respiratory distress or retractions.      Breath sounds: Normal breath sounds. No decreased breath sounds, wheezing, rhonchi or rales.   Lymphadenopathy:      Cervical: No cervical adenopathy.      Right cervical: No superficial or posterior cervical adenopathy.     Left cervical: No superficial or posterior cervical adenopathy.   Skin:     General: Skin is warm and dry.   Neurological:      Mental Status: She is alert and oriented to person, place, and time.   Psychiatric:         Mood and Affect: Mood normal.         Behavior: Behavior normal.           ASSESSMENT AND PLAN:   Scarlett Martinez is a 39 year old female who presents with upper respiratory symptoms that are consistent with    ASSESSMENT:   Encounter Diagnoses   Name Primary?    Influenza A Yes    Viral syndrome        PLAN: Patient positive for influenza A in clinic, offered Tamiflu 90 antiviral, patient  accepted.  Reviewed that patient can continue to take Tylenol and Motrin as needed for fever, sore throat, body aches, and chills.  Encouraged patient to push fluids and rest.  Meds as below.  Comfort care as described in Patient Instructions.  Patient may return to work/school if fever free for 24 hours without the use of fever reducing medication and noted symptom improvement.    Report to emergency room if patient experiences shortness of breath, difficulty breathing, and/or chest pain.    Meds & Refills for this Visit:  Requested Prescriptions     Signed Prescriptions Disp Refills    oseltamivir 75 MG Oral Cap 10 capsule 0     Sig: Take 1 capsule (75 mg total) by mouth 2 (two) times daily for 5 days.     Risks, benefits, and side effects of medication explained and discussed.    The patient indicates understanding of these issues and agrees to the plan.  The patient is asked to f/u with PCP if sx's persist.

## 2025-01-01 ENCOUNTER — HOSPITAL ENCOUNTER (OUTPATIENT)
Age: 40
Discharge: HOME | End: 2025-01-01
Attending: STUDENT IN AN ORGANIZED HEALTH CARE EDUCATION/TRAINING PROGRAM
Payer: MEDICAID

## 2025-01-01 VITALS
RESPIRATION RATE: 20 BRPM | SYSTOLIC BLOOD PRESSURE: 128 MMHG | DIASTOLIC BLOOD PRESSURE: 71 MMHG | HEART RATE: 77 BPM | OXYGEN SATURATION: 99 % | TEMPERATURE: 98 F

## 2025-01-01 DIAGNOSIS — J00 ACUTE RHINITIS: ICD-10-CM

## 2025-01-01 DIAGNOSIS — H65.93 FLUID LEVEL BEHIND TYMPANIC MEMBRANE OF BOTH EARS: Primary | ICD-10-CM

## 2025-01-01 PROCEDURE — 99213 OFFICE O/P EST LOW 20 MIN: CPT

## 2025-01-01 PROCEDURE — 99212 OFFICE O/P EST SF 10 MIN: CPT

## 2025-01-01 NOTE — ED INITIAL ASSESSMENT (HPI)
Patient states she is Flu A +. Presets with headache and bilateral ear pain. Some dizziness at times. Taking Ibuprofen.

## 2025-01-01 NOTE — DISCHARGE INSTRUCTIONS
Your exam is consistent with nasal mucosal swelling called rhinitis for which I recommend over-the-counter intranasal Flonase.  I am concerned this has caused fluid to build up in the ears and sinuses causing your pressure in the face and ears, and therefore recommend over-the-counter intranasal Flonase so as to help facilitate forward drainage of secretions and to decrease pressure in the face and ears.    At this time your exam and evaluation are consistent with a viral infection. Viral infections do not respond to antibiotics and are treated symptomatically. Ensure to rest and maintain hydration. Viral infections can progress and can lead to bacterial infections. If you develop any new, progressing, changing, or worsening signs or symptoms, please present immediately to your primary care physician for reassessment. If you develop any difficulty breathing, chest pain, shortness of breath, difficulty swallowing, drooling, signs or symptoms of dehydration, or any other concerning symptoms, call 911 or present immediately to the emergency department.

## 2025-01-01 NOTE — ED PROVIDER NOTES
Patient Seen in: [facility]      History     Chief Complaint   Patient presents with    Ear Pain     Stated Complaint: Ear Problem Pain    Subjective:   HPI      39-year-old female with no significant past medical history, who presents with concern for B/L ear pain and frontal facial pressure.  She states she was diagnosed with influenza and recently completed Tamiflu to overall no improvement in her symptoms.  She denies any fevers.  Symptoms have been overall unchanged since initial diagnosis with influenza.    Objective:     Past Medical History:    Anemia    Gestational diabetes (HCC)              Past Surgical History:   Procedure Laterality Date                      Social History     Socioeconomic History    Marital status:    Tobacco Use    Smoking status: Never     Passive exposure: Never    Smokeless tobacco: Never   Vaping Use    Vaping status: Never Used   Substance and Sexual Activity    Alcohol use: Yes     Comment: occasional    Drug use: Never    Sexual activity: Yes     Partners: Male     Social Drivers of Health     Financial Resource Strain: Low Risk  (2023)    Financial Resource Strain     Difficulty of Paying Living Expenses: Not hard at all     Med Affordability: No   Food Insecurity: Not on File (2024)    Received from [email or web address]    Food Insecurity     Food: 0   Transportation Needs: Unknown (2023)    Transportation Needs     Car Seat: Yes   Physical Activity: Not on File (10/7/2022)    Received from [email or web address]    Physical Activity     Physical Activity: 0   Stress: No Stress Concern Present (2023)    Stress     Feeling of Stress : No   Social Connections: Not on File (2024)    Received from [email or web address]    Social Connections     Connectedness: 0   Housing Stability: Low Risk  (2023)    Housing Stability     Housing Instability: No              Review of Systems    Positive for stated complaint: Ear Problem Pain  Other systems are as noted in  HPI.  Constitutional and vital signs reviewed.      All other systems reviewed and negative except as noted above.    Physical Exam     ED Triage Vitals [01/01/25 1336]   /71   Pulse 77   Resp 20   Temp 98.2 °F (36.8 °C)   Temp src Oral   SpO2 99 %   O2 Device None (Room air)       Current Vitals:   Vital Signs  BP: 128/71  Pulse: 77  Resp: 20  Temp: 98.2 °F (36.8 °C)  Temp src: Oral    Oxygen Therapy  SpO2: 99 %  O2 Device: None (Room air)        Physical Exam    General: Awake, alert, comfortable on room air, in no distress, tolerating oral secretions, interactive  Pulmonary: Lungs CTA B, no wheezing, no conversational dyspnea  Neuro: Symmetrical facial expressions on gross observation  HEENT: No periorbital edema or erythema, nonerythematous and nonedematous intact B/L Tms but with small B/L middle ear effusions, no erythema or edema of the B/L ear canals or external ears or mastoid regions, erythematous and edematous nearly obstructive B/L nasal turbinates with nasal congestion present, nonerythematous and nonedematous B/L tonsils, no tonsillar exudates, no peritonsillar edema, uvula midline, tolerating oral secretions, normal speech, no submandibular edema  Psych: Normal mood, normal affect    ED Course   No labs or imaging performed  MDM   Patient is awake, alert, comfortable on room air, in no distress, afebrile, lungs CTAB with no wheezing with no sign of acute bronchospasm, no fevers or focal findings to suggest pneumonia at this time, no sign of otitis media or otitis externa, patient has signs of rhinitis with middle ear effusions consistent with likely viral URI associated with recent influenza diagnosis, no sign of tonsillitis or PTA or deep space infection  -We discussed that currently there are no signs of bacterial infection to indicate antibiotics, although viral infections can make her more susceptible to superimposed bacterial infections and therefore with any new, changing, or worsening  signs symptoms, recommended immediate reassessment  -We discussed symptomatic management with rest, hydration, over-the-counter Tylenol or ibuprofen if needed for pain or fever control as long as no contraindications and over-the-counter intranasal Flonase for rhinitis so as to help facilitate forward drainage of secretions and to decrease pressure in the face and middle ear effusions    MDM    Disposition and Plan     Clinical Impression:  1. Fluid level behind tympanic membrane of both ears    2. Acute rhinitis         Disposition:  Discharge  1/1/2025  2:07 pm    Follow-up:    Primary care physician      Medications Prescribed:  Discharge Medication List as of 1/1/2025  2:08 PM            None

## (undated) DEVICE — ENSEAL X1 TISSUE SEALER, CURVED JAW, 25 CM SHAFT LENGTH: Brand: ENSEAL

## (undated) DEVICE — EXTRA LARGE, DISPOSABLE C-SECTION PROTECTOR - RETRACTOR: Brand: ALEXIS ® O C-SECTION PROTECTOR - RETRACTOR

## (undated) DEVICE — CLIPPER BLADE WIDE 3M

## (undated) DEVICE — 3M™ MEDIPORE™ H SOFT CLOTH SURGICAL TAPE 2864, 4 INCH X 10 YARD (10CM X 9,14M), 12 ROLLS/CASE: Brand: 3M™ MEDIPORE™

## (undated) DEVICE — INTENDED FOR TISSUE SEPARATION, AND OTHER PROCEDURES THAT REQUIRE A SHARP SURGICAL BLADE TO PUNCTURE OR CUT.: Brand: BARD-PARKER ® DISPOSABLE SCALPELS

## (undated) NOTE — LETTER
VACCINE ADMINISTRATION RECORD  PARENT / GUARDIAN APPROVAL  Date: 2023  Vaccine administered to: Dorothea Soto     : 9/15/1985    MRN: [id number]    A copy of the appropriate Centers for Disease Control and Prevention Vaccine Information statement has been provided. I have read or have had explained the information about the diseases and the vaccines listed below. There was an opportunity to ask questions and any questions were answered satisfactorily. I believe that I understand the benefits and risks of the vaccine cited and ask that the vaccine(s) listed below be given to me or to the person named above (for whom I am authorized to make this request). VACCINES ADMINISTERED:  Tdap    I have read and hereby agree to be bound by the terms of this agreement as stated above. My signature is valid until revoked by me in writing. This document is signed by PATIENT relationship: Self on 2023.:                                                                                                                                         Parent / Wonda Dynes Signature                                                Date    MARK Cheney served as a witness to authentication that the identity of the person signing electronically is in fact the person represented as signing. This document was generated by MARK Cheney on 2023.